# Patient Record
Sex: FEMALE | Race: WHITE | NOT HISPANIC OR LATINO | Employment: FULL TIME | URBAN - METROPOLITAN AREA
[De-identification: names, ages, dates, MRNs, and addresses within clinical notes are randomized per-mention and may not be internally consistent; named-entity substitution may affect disease eponyms.]

---

## 2017-10-23 ENCOUNTER — HOSPITAL ENCOUNTER (EMERGENCY)
Facility: HOSPITAL | Age: 27
Discharge: HOME | End: 2017-10-23
Attending: EMERGENCY MEDICINE | Admitting: EMERGENCY MEDICINE
Payer: COMMERCIAL

## 2017-10-23 VITALS
SYSTOLIC BLOOD PRESSURE: 124 MMHG | RESPIRATION RATE: 18 BRPM | TEMPERATURE: 98.5 F | OXYGEN SATURATION: 100 % | HEART RATE: 99 BPM | BODY MASS INDEX: 31.25 KG/M2 | DIASTOLIC BLOOD PRESSURE: 54 MMHG | WEIGHT: 160 LBS

## 2017-10-23 DIAGNOSIS — N30.90 CYSTITIS: ICD-10-CM

## 2017-10-23 DIAGNOSIS — N39.0 UTI (URINARY TRACT INFECTION): Primary | ICD-10-CM

## 2017-10-23 LAB
BACTERIA UR QL AUTO: ABNORMAL /HPF
BILIRUB UR QL STRIP: NEGATIVE
CLARITY UR: ABNORMAL
COLOR UR: YELLOW
GLUCOSE UR STRIP-MCNC: NEGATIVE MG/DL
HGB UR QL STRIP.AUTO: ABNORMAL
HYALINE CASTS #/AREA URNS LPF: ABNORMAL /LPF
KETONES UR STRIP-MCNC: NEGATIVE MG/DL
LEUKOCYTE ESTERASE UR QL STRIP: ABNORMAL
NITRITE UR QL STRIP: NEGATIVE
NON-SQ EPI CELLS URNS QL MICRO: ABNORMAL /HPF
PH UR STRIP.AUTO: 6 [PH] (ref 4.5–8)
PROT UR STRIP-MCNC: >=300 MG/DL
RBC #/AREA URNS AUTO: ABNORMAL /HPF
SP GR UR STRIP.AUTO: 1.02 (ref 1–1.03)
UROBILINOGEN UR QL STRIP.AUTO: 0.2 E.U./DL
WBC #/AREA URNS AUTO: ABNORMAL /HPF

## 2017-10-23 PROCEDURE — 87077 CULTURE AEROBIC IDENTIFY: CPT

## 2017-10-23 PROCEDURE — 81002 URINALYSIS NONAUTO W/O SCOPE: CPT | Performed by: EMERGENCY MEDICINE

## 2017-10-23 PROCEDURE — 87086 URINE CULTURE/COLONY COUNT: CPT

## 2017-10-23 PROCEDURE — 99283 EMERGENCY DEPT VISIT LOW MDM: CPT

## 2017-10-23 PROCEDURE — 87186 SC STD MICRODIL/AGAR DIL: CPT

## 2017-10-23 PROCEDURE — 81001 URINALYSIS AUTO W/SCOPE: CPT

## 2017-10-23 RX ORDER — CEPHALEXIN 500 MG/1
500 CAPSULE ORAL EVERY 8 HOURS SCHEDULED
Qty: 21 CAPSULE | Refills: 0 | Status: SHIPPED | OUTPATIENT
Start: 2017-10-23 | End: 2017-10-30

## 2017-10-23 RX ORDER — PHENAZOPYRIDINE HYDROCHLORIDE 100 MG/1
100 TABLET, FILM COATED ORAL 3 TIMES DAILY PRN
Qty: 6 TABLET | Refills: 0 | Status: SHIPPED | OUTPATIENT
Start: 2017-10-23 | End: 2017-10-23

## 2017-10-23 NOTE — ED ATTENDING ATTESTATION
Bushra Underwood MD, saw and evaluated the patient  I have discussed the patient with the resident/non-physician practitioner and agree with the resident's/non-physician practitioner's findings, Plan of Care, and MDM as documented in the resident's/non-physician practitioner's note, except where noted  All available labs and Radiology studies were reviewed  At this point I agree with the current assessment done in the Emergency Department  I have conducted an independent evaluation of this patient a history and physical is as follows:     this is a 45-year-old woman who is 1 week postpartum who presents with dysuria  The patient states that she feels like she has cystitis, with urinary frequency, urgency, and burning  The patient had a vaginal delivery about a week ago  It was complicated by retained placenta, which was manually reduce moved  The patient has not had changes in her discharge  She has not had fevers or chills  She has not had malaise, nausea, or vomiting  The patient has no other complaints at this time  On exam she has mild suprapubic pain  Her uterus is not boggy, but is firm  The remainder of her exam is benign  Impression: Likely urinary tract infection    Will check urine for evidence of infection and treat  Critical Care Time  CritCare Time

## 2017-10-23 NOTE — DISCHARGE INSTRUCTIONS

## 2017-10-23 NOTE — ED PROVIDER NOTES
History  Chief Complaint   Patient presents with    Possible UTI     Pt gave birth 1 week ago and states today "I have a really bad bladder infection " Pt c/o burning on urination     52-year-old who is approximately 1 week status post vaginal delivery presents complaining of a possible UTI  Reports having 2 days of dysuria, increased frequency and burning  Reports having small amount of vaginal bleeding which has been decreased since delivery  Delivery was complicated by retained products of conception requiring D&C  She does reports some suprapubic discomfort but no significant abdominal pain  No fevers or chills or back pain  None       Past Medical History:   Diagnosis Date    Psychiatric disorder        Past Surgical History:   Procedure Laterality Date    NO PAST SURGERIES         History reviewed  No pertinent family history  I have reviewed and agree with the history as documented  Social History   Substance Use Topics    Smoking status: Never Smoker    Smokeless tobacco: Never Used    Alcohol use No        Review of Systems   Constitutional: Negative for chills and fever  HENT: Negative for congestion and sore throat  Eyes: Negative for pain and redness  Respiratory: Negative for shortness of breath and wheezing  Cardiovascular: Negative for chest pain and palpitations  Gastrointestinal: Negative for abdominal pain, diarrhea and vomiting  Endocrine: Negative for polydipsia and polyphagia  Genitourinary: Positive for dysuria, frequency and vaginal bleeding  Negative for flank pain  Musculoskeletal: Negative for arthralgias and back pain  Skin: Negative for rash and wound  Neurological: Negative for seizures and headaches  Psychiatric/Behavioral: Negative for agitation and behavioral problems  All other systems reviewed and are negative        Physical Exam  ED Triage Vitals   Temperature Pulse Respirations Blood Pressure SpO2   10/23/17 1600 10/23/17 1439 10/23/17 1439 10/23/17 1439 10/23/17 1439   98 5 °F (36 9 °C) 99 18 124/54 100 %      Temp Source Heart Rate Source Patient Position - Orthostatic VS BP Location FiO2 (%)   10/23/17 1600 10/23/17 1439 10/23/17 1439 10/23/17 1439 --   Oral Monitor Sitting Left arm       Pain Score       10/23/17 1439       No Pain           Physical Exam   Constitutional: She is oriented to person, place, and time  She appears well-developed and well-nourished  HENT:   Head: Normocephalic and atraumatic  Right Ear: External ear normal    Left Ear: External ear normal    Mouth/Throat: Oropharynx is clear and moist    Eyes: EOM are normal  Pupils are equal, round, and reactive to light  Neck: Normal range of motion  Cardiovascular: Normal rate, regular rhythm and normal heart sounds  Exam reveals no friction rub  No murmur heard  Pulmonary/Chest: Effort normal  No respiratory distress  She has no wheezes  Abdominal: Soft  Bowel sounds are normal  She exhibits no distension  There is no rebound and no guarding  Mild suprapubic tenderness to palpation without rebound or guarding   Musculoskeletal: Normal range of motion  She exhibits no edema  Neurological: She is alert and oriented to person, place, and time  No cranial nerve deficit  Coordination normal    Skin: Skin is warm  No erythema  Psychiatric: She has a normal mood and affect  Her behavior is normal    Nursing note and vitals reviewed        ED Medications  Medications - No data to display    Diagnostic Studies  Labs Reviewed   URINE MICROSCOPIC - Abnormal        Result Value Ref Range Status    RBC, UA 30-50 (*) None Seen, 0-5 /hpf Final    WBC, UA Innumerable (*) None Seen, 0-5, 5-55, 5-65 /hpf Final    Hyaline Casts, UA 5-10 (*) None Seen /lpf Final    Epithelial Cells Occasional  None Seen, Occasional /hpf Final    Bacteria, UA Occasional  None Seen, Occasional /hpf Final   ED URINE MACROSCOPIC - Abnormal     Leukocytes, UA Small (*) Negative Final Protein, UA >=300 (*) Negative mg/dl Final    Blood, UA Large (*) Negative Final    Color, UA Yellow   Final    Clarity, UA Slightly Cloudy   Final    pH, UA 6 0  4 5 - 8 0 Final    Nitrite, UA Negative  Negative Final    Glucose, UA Negative  Negative mg/dl Final    Ketones, UA Negative  Negative mg/dl Final    Urobilinogen, UA 0 2  0 2, 1 0 E U /dl E U /dl Final    Bilirubin, UA Negative  Negative Final    Specific Gravity, UA 1 025  1 003 - 1 030 Final    Narrative:     CLINITEK RESULT   POCT URINALYSIS DIPSTICK - Normal    Color, UA     Final    Comment: done by prior staff   URINE CULTURE       No orders to display       Procedures  Procedures      Phone Consults  ED Phone Contact    ED Course  ED Course                                MDM  Number of Diagnoses or Management Options  Cystitis:   UTI (urinary tract infection):   Diagnosis management comments: Impression:  Well-appearing patient with suprapubic pain and dysuria, likely cystitis/UTI  Plan:  Urinalysis, treat for UTI    CritCare Time    Disposition  Final diagnoses:   UTI (urinary tract infection)   Cystitis     ED Disposition     ED Disposition Condition Comment    Discharge  Tita Cowcheco discharge to home/self care      Condition at discharge: Good        Follow-up Information     Follow up With Specialties Details Why [facility] Emergency Department Emergency Medicine  As needed, If symptoms worsen [address]  [phone]  ED, [address] [place] [address], [phone]        Discharge Medication List as of 10/23/2017  4:06 PM      START taking these medications    Details   cephalexin (KEFLEX) 500 mg capsule Take 1 capsule by mouth every 8 (eight) hours for 7 days, Starting Mon 10/23/2017, Until Mon 10/30/2017, Print      phenazopyridine (PYRIDIUM) 100 mg tablet Take 1 tablet by mouth 3 (three) times a day as needed for bladder spasms for up to 2 days, Starting Mon 10/23/2017, Until Wed 10/25/2017, Print           No discharge procedures on file  ED Provider  Attending physically available and evaluated Erorl Alvarez I managed the patient along with the ED Attending      Electronically Signed by       Marybel Yoon MD  Resident  10/24/17 8642

## 2017-10-25 LAB — BACTERIA UR CULT: ABNORMAL

## 2019-01-07 ENCOUNTER — OFFICE VISIT (OUTPATIENT)
Dept: OBGYN CLINIC | Facility: CLINIC | Age: 29
End: 2019-01-07

## 2019-01-07 ENCOUNTER — APPOINTMENT (OUTPATIENT)
Dept: LAB | Facility: HOSPITAL | Age: 29
End: 2019-01-07
Payer: COMMERCIAL

## 2019-01-07 VITALS
BODY MASS INDEX: 29.25 KG/M2 | DIASTOLIC BLOOD PRESSURE: 77 MMHG | HEIGHT: 60 IN | WEIGHT: 149 LBS | SYSTOLIC BLOOD PRESSURE: 121 MMHG | HEART RATE: 88 BPM

## 2019-01-07 DIAGNOSIS — Z72.51 HIGH RISK HETEROSEXUAL BEHAVIOR: Primary | ICD-10-CM

## 2019-01-07 DIAGNOSIS — B37.3 VAGINAL YEAST INFECTION: ICD-10-CM

## 2019-01-07 DIAGNOSIS — Z72.51 HIGH RISK HETEROSEXUAL BEHAVIOR: ICD-10-CM

## 2019-01-07 LAB
C TRACH DNA SPEC QL NAA+PROBE: NEGATIVE
N GONORRHOEA DNA SPEC QL NAA+PROBE: NEGATIVE

## 2019-01-07 PROCEDURE — 87591 N.GONORRHOEAE DNA AMP PROB: CPT | Performed by: NURSE PRACTITIONER

## 2019-01-07 PROCEDURE — 36415 COLL VENOUS BLD VENIPUNCTURE: CPT

## 2019-01-07 PROCEDURE — 87389 HIV-1 AG W/HIV-1&-2 AB AG IA: CPT

## 2019-01-07 PROCEDURE — 99202 OFFICE O/P NEW SF 15 MIN: CPT | Performed by: NURSE PRACTITIONER

## 2019-01-07 PROCEDURE — 87491 CHLMYD TRACH DNA AMP PROBE: CPT | Performed by: NURSE PRACTITIONER

## 2019-01-07 PROCEDURE — 86592 SYPHILIS TEST NON-TREP QUAL: CPT

## 2019-01-07 PROCEDURE — 87340 HEPATITIS B SURFACE AG IA: CPT

## 2019-01-07 PROCEDURE — 87210 SMEAR WET MOUNT SALINE/INK: CPT | Performed by: NURSE PRACTITIONER

## 2019-01-07 RX ORDER — CLOTRIMAZOLE AND BETAMETHASONE DIPROPIONATE 10; .64 MG/G; MG/G
1 CREAM TOPICAL AS NEEDED
COMMUNITY
Start: 2018-10-19 | End: 2019-02-22 | Stop reason: SDUPTHER

## 2019-01-07 RX ORDER — FLUCONAZOLE 150 MG/1
150 TABLET ORAL ONCE
Qty: 1 TABLET | Refills: 0 | Status: SHIPPED | OUTPATIENT
Start: 2019-01-07 | End: 2019-01-07

## 2019-01-07 NOTE — PATIENT INSTRUCTIONS
Vulvovaginal Candidiasis   AMBULATORY CARE:   Vulvovaginal candidiasis,  or yeast infection, is a common vaginal infection  Vulvovaginal candidiasis is caused by a fungus, or yeast-like germ  Fungi are normally found in your vagina  When there are too many fungi, it can cause an infection  Seek care immediately if:   · You have fever and chills  · You are bleeding from your vagina and it is not your monthly period  · You develop abdominal or pelvic pain  Contact your healthcare provider if:   · Your signs and symptoms get worse, even after treatment  · You have questions or concerns about your condition or care  Signs and symptoms:   · Thick, white, cheese-like discharge from your vagina    · Itching, swelling, or redness in your vagina    · Burning when you urinate  Treatment for vulvovaginal candidiasis  includes medicines to treat the fungal infection and decrease inflammation  The medicine may be a pill, topical cream, or vaginal suppository  Manage your symptoms:   · Wear cotton underwear  · Keep the vaginal area clean and dry  · Do not have sex until your symptoms are gone  · Do not douche  · Do not use feminine hygiene sprays, powders, or bubble bath  Prevent another infection:   · Take showers instead of baths  · Eat yogurt  · Limit the amount of alcohol you drink  · Control your blood sugar if you are diabetic  · Limit your time in hot tubs  Follow up with your healthcare provider as directed:  Write down your questions so you remember to ask them during your visits  © 2017 [address] [employer] Information is for End User's use only and may not be sold, redistributed or otherwise used for commercial purposes  All illustrations and images included in CareNotes® are the copyrighted property of [employer] M [employer]  or Sammy Yuen  The above information is an  only   It is not intended as medical advice for individual conditions or treatments  Talk to your doctor, nurse or pharmacist before following any medical regimen to see if it is safe and effective for you  Safe Sex   WHAT YOU NEED TO KNOW:   What is safe sex? Safe sex is a combination of practices you can do to prevent pregnancy and the spread of sexually transmitted infections (STIs)  These practices help to decrease or prevent the exchange of body fluids during sexual contact  Body fluids include saliva, urine, blood, vaginal fluids, and semen  All types of sex can cause STIs  This includes oral, vaginal, and anal sex  How do I practice safe sex? Talk to your partner before you have sex  Ask about his or her sexual history and any current or past STI  · Use condoms and barrier methods for all types of sexual contact  Use a new condom or latex barrier each time you have sex  This includes oral, vaginal, and anal sex  Make sure that the condom fits and is put on correctly  Rubber latex sheets or dental dams can be used for oral sex  Ask your healthcare provider how to use these items and where to purchase them  If you are allergic to latex, use a nonlatex product such as polyurethane  · Limit your number of sexual partners  More than one sex partner can increase your risk for an STI  Do not have sex with anyone whose sexual history you do not know  · Do not do activities that can pass germs  Do not use saliva as a lubricant or share sex toys  · Tell your sex partner if you have an STI  Your partner may need to be tested and treated  Do not have sex while you are being treated for an STI, or with a partner who is being treated  · Get tested regularly for STIs  Get tested if you have had sexual contact with someone who has an STI  Get tested if you have unprotected sex with any new partner  · Get vaccinated  Vaccines may help to lower your risk for an STI such as HPV, hepatitis A, or hepatitis B   Ask your healthcare provider for more information on vaccines  How else can I practice safe sex? · Only use water-based lubricants during sex  Water-based lubricants may prevent sores or cuts in the vagina or penis  Prevent sores or cuts to decrease your risk for an STI  Do not use oil-based lubricants, such as baby oil or hand lotion, with latex condoms or barriers  These will weaken the latex and may cause it to break  · Do not use chemical irritants on condoms or genitals  Products that contain chemical irritants, such as spermicides, can irritate the lining of your vagina or rectum  Irritation may cause sores that may increase your risk for an STI  · Be careful when you have sex if you have open sores or cuts  Open sores or cuts may increase your risk for an STI  This includes new piercings and tattoos  Keep all open sores or cuts covered during sex  Do not have oral sex if you have cuts or sores in your mouth  Ask your healthcare provider when it is safe to have sex after you get a tattoo or piercing  · Do not use alcohol or drugs before sex  These substances can prevent you from thinking clearly and increase your risk for unsafe sex  Where can I find more information? · [address] [address] ([employer]UREL[employer])  P O  [address] [facility] , [address] [place] [address]  Web Address: [email or web address]  [email or web address]  When should I seek immediate care? · A condom breaks, leaks, or slips off while you are having sex  · You notice sores on your penis, vagina, anal area, or the skin around them  · You have had unsafe sex and want to discuss emergency contraception or treatment for STI exposure  When should I contact my healthcare provider? · You think you might be pregnant  · You have questions or concerns about your condition or care  CARE AGREEMENT:   Informed consent  is a legal document that explains the tests, treatments, or procedures that you may need   Informed consent means you understand what will be done and can make decisions about what you want  You give your permission when you sign the consent form  You can have someone sign this form for you if you are not able to sign it  You have the right to understand your medical care in words you know  Before you sign the consent form, understand the risks and benefits of what will be done  Make sure all your questions are answered  The above information is an  only  It is not intended as medical advice for individual conditions or treatments  Talk to your doctor, nurse or pharmacist before following any medical regimen to see if it is safe and effective for you  © 2017 [facility] [facility] Information is for End User's use only and may not be sold, redistributed or otherwise used for commercial purposes  All illustrations and images included in CareNotes® are the copyrighted property of A D A M , Inc  or Sammy Alpa  Fluconazole (By mouth)   Fluconazole ([id number])  Prevents and treats fungal infections  Brand Name(s): Diflucan   There may be other brand names for this medicine  When This Medicine Should Not Be Used: This medicine is not right for everyone  Do not use it if you had an allergic reaction to fluconazole, or if you are pregnant  How to Use This Medicine:   Liquid, Tablet  · Your doctor will tell you how much medicine to use  Do not use more than directed  · Oral liquid: Shake well just before each use  Measure the oral liquid medicine with a marked measuring spoon, oral syringe, or medicine cup  · Take all of the medicine in your prescription to clear up your infection, even if you feel better after the first few doses  · Read and follow the patient instructions that come with this medicine  Talk to your doctor or pharmacist if you have any questions  · Missed dose: Take a dose as soon as you remember  If it is almost time for your next dose, wait until then and take a regular dose   Do not take extra medicine to make up for a missed dose   · Store the medicine in a closed container at room temperature, away from heat, moisture, and direct light  Store the oral liquid in the refrigerator or at room temperature and use it within 14 days  Do not freeze  Drugs and Foods to Avoid:   Ask your doctor or pharmacist before using any other medicine, including over-the-counter medicines, vitamins, and herbal products  · Do not use this medicine together with astemizole, cisapride, erythromycin, pimozide, quinidine, or terfenadine  · Some foods and medicines can affect how fluconazole works  Tell your doctor if you are using cimetidine, midazolam, prednisone, rifabutin, rifampin, theophylline, tofacitinib, triazolam, vitamin A supplements, or voriconazole  Also tell your doctor if you are using any of the following:   ¨ A blood thinner (such as warfarin)  ¨ A diuretic or "water pill" (such as hydrochlorothiazide), or blood pressure medicine (such as amlodipine, felodipine, isradipine, losartan, nifedipine)  ¨ Birth control pills  ¨ Cancer medicine (cyclophosphamide, vinblastine, vincristine)  ¨ Diabetes medicine that you take by mouth (glipizide, glyburide, tolbutamide)  ¨ Medicine to lower cholesterol (atorvastatin, fluvastatin, simvastatin)  ¨ Medicine to treat depression (amitriptyline, nortriptyline)  ¨ Medicine to treat HIV/AIDS (saquinavir, zidovudine)  ¨ Medicine to treat malaria (halofantrine)  ¨ Medicine to treat seizures (carbamazepine, phenytoin)  ¨ Medicine that weakens the immune system (cyclosporine, sirolimus, tacrolimus)  ¨ Narcotic pain medicine (alfentanil, fentanyl, methadone)  ¨ Pain or arthritis medicine (aspirin, celecoxib, diclofenac, ibuprofen, naproxen)  Warnings While Using This Medicine:   · It is not safe to take this medicine during pregnancy  It could harm an unborn baby  Tell your doctor right away if you become pregnant    · Tell your doctor if you are breastfeeding, or if you have kidney disease, liver disease, heart disease, heart rhythm problems, cancer, or HIV/AIDS  · This medicine may cause the following problems:   ¨ Liver problems  ¨ Serious skin reactions  ¨ Changes in heart rhythm, such as a condition called QT prolongation  · This medicine may make you dizzy or drowsy  Do not drive or do anything that could be dangerous until you know how this medicine affects you  · Call your doctor if your symptoms do not improve or if they get worse  · Keep all medicine out of the reach of children  Never share your medicine with anyone  Possible Side Effects While Using This Medicine:   Call your doctor right away if you notice any of these side effects:  · Allergic reaction: Itching or hives, swelling in your face or hands, swelling or tingling in your mouth or throat, chest tightness, trouble breathing  · Blistering, peeling, or red skin rash  · Dark urine or pale stools, nausea, vomiting, loss of appetite, stomach pain, yellow skin or eyes  · Fast, pounding, or uneven heartbeat  · Unusual bleeding, bruising, or weakness  If you notice these less serious side effects, talk with your doctor:   · Headache  · Mild nausea, vomiting, stomach pain, or diarrhea  If you notice other side effects that you think are caused by this medicine, tell your doctor  Call your doctor for medical advice about side effects  You may report side effects to FDA at 8-045-FDA-8010  © 2017 [address] [employer] Information is for End User's use only and may not be sold, redistributed or otherwise used for commercial purposes  The above information is an  only  It is not intended as medical advice for individual conditions or treatments  Talk to your doctor, nurse or pharmacist before following any medical regimen to see if it is safe and effective for you

## 2019-01-07 NOTE — PROGRESS NOTES
Assessment/Plan:      Diagnoses and all orders for this visit:    High risk heterosexual behavior  -     Chlamydia/GC amplified DNA by PCR  -     Hepatitis B surface antigen; Future  -     HIV 1/2 AG-AB combo; Future  -     RPR; Future    Vaginal yeast infection  -     fluconazole (DIFLUCAN) 150 mg tablet; Take 1 tablet (150 mg total) by mouth once for 1 dose  -     POCT wet mount    Other orders  -     clotrimazole-betamethasone (LOTRISONE) 1-0 05 % cream; Apply 1 application topically as needed  -     levonorgestrel (MIRENA) 20 MCG/24HR IUD; 1 each by Intrauterine route once      - reviewed with patient safe sex practices including consistent condom use   -vaginal yeast infection     -Rx fluconazole 1 tablet today      -written information provided  Patient encouraged to avoid douching, scented soaps lotions or lubricants  -will call with abnormal results   -encouraged patient have records sent to our office from prior OB/ GYN    RTO 6 months for annual exam     Subjective:     Patient ID: Denilson Leach is a 33 y o  female  HPI  here requesting STI testing  Has had 2 new partners in the past 6 months  Has Mirena IUD for contraception  Mirena IUD was placed approximately 2016  inconsistently uses condoms  Patient states she has had 2-3 weeks of white thick vaginal discharge denies odor or itching  Has not used any over-the-counter products  Denies alleviating or aggravating factors  Denies pelvic pain, urinary symptoms  Last Pap smear/annual exam-6 months ago in [facility] patient denies history of abnormal Pap smears  Review of Systems   Constitutional: Negative for chills and fever  Respiratory: Negative  Cardiovascular: Negative  Gastrointestinal: Negative  Genitourinary: Positive for vaginal discharge  Negative for difficulty urinating, dyspareunia, frequency, pelvic pain, urgency, vaginal bleeding and vaginal pain  Neurological: Negative for headaches           Objective: Physical Exam   Constitutional: She is oriented to person, place, and time  She appears well-developed and well-nourished  Cardiovascular: Normal rate, regular rhythm and normal heart sounds  Pulmonary/Chest: Effort normal and breath sounds normal    Genitourinary: There is no rash, tenderness, lesion or injury on the right labia  There is no rash, tenderness, lesion or injury on the left labia  Cervix exhibits no motion tenderness, no discharge and no friability  No erythema, tenderness or bleeding in the vagina  No foreign body in the vagina  No signs of injury around the vagina  Vaginal discharge found  Genitourinary Comments: Mirena IUD strings visualized with speculum exam   Small amount of a white vaginal discharge  Neurological: She is alert and oriented to person, place, and time  Skin: Skin is warm and dry  Psychiatric: She has a normal mood and affect   Her behavior is normal

## 2019-01-08 LAB
HBV SURFACE AG SER QL: NORMAL
HIV 1+2 AB+HIV1 P24 AG SERPL QL IA: NORMAL
RPR SER QL: NORMAL

## 2019-02-21 ENCOUNTER — TELEPHONE (OUTPATIENT)
Dept: OBGYN CLINIC | Facility: CLINIC | Age: 29
End: 2019-02-21

## 2019-02-22 DIAGNOSIS — N90.89 VULVAR IRRITATION: Primary | ICD-10-CM

## 2019-02-22 RX ORDER — CLOTRIMAZOLE AND BETAMETHASONE DIPROPIONATE 10; .64 MG/G; MG/G
1 CREAM TOPICAL AS NEEDED
Qty: 45 G | Refills: 0 | Status: SHIPPED | OUTPATIENT
Start: 2019-02-22 | End: 2019-11-08 | Stop reason: ALTCHOICE

## 2019-02-26 ENCOUNTER — TELEPHONE (OUTPATIENT)
Dept: OBGYN CLINIC | Facility: CLINIC | Age: 29
End: 2019-02-26

## 2019-04-04 DIAGNOSIS — B37.3 VAGINAL YEAST INFECTION: Primary | ICD-10-CM

## 2019-04-04 RX ORDER — FLUCONAZOLE 150 MG/1
1 TABLET ORAL ONCE
COMMUNITY
Start: 2019-01-07 | End: 2019-04-04 | Stop reason: SDUPTHER

## 2019-04-04 RX ORDER — FLUCONAZOLE 150 MG/1
150 TABLET ORAL ONCE
Qty: 1 TABLET | Refills: 0 | Status: SHIPPED | OUTPATIENT
Start: 2019-04-04 | End: 2019-04-04

## 2019-05-28 ENCOUNTER — HOSPITAL ENCOUNTER (EMERGENCY)
Facility: HOSPITAL | Age: 29
Discharge: HOME | End: 2019-05-28
Attending: EMERGENCY MEDICINE

## 2019-05-28 VITALS
HEART RATE: 86 BPM | BODY MASS INDEX: 30.86 KG/M2 | HEIGHT: 60 IN | RESPIRATION RATE: 18 BRPM | OXYGEN SATURATION: 96 % | SYSTOLIC BLOOD PRESSURE: 111 MMHG | TEMPERATURE: 98.8 F | DIASTOLIC BLOOD PRESSURE: 59 MMHG | WEIGHT: 157.19 LBS

## 2019-05-28 DIAGNOSIS — H60.90 OTITIS EXTERNA: Primary | ICD-10-CM

## 2019-05-28 PROCEDURE — 99282 EMERGENCY DEPT VISIT SF MDM: CPT | Performed by: PHYSICIAN ASSISTANT

## 2019-05-28 PROCEDURE — 99282 EMERGENCY DEPT VISIT SF MDM: CPT

## 2019-05-28 RX ORDER — OFLOXACIN 3 MG/ML
5 SOLUTION AURICULAR (OTIC) 2 TIMES DAILY
Qty: 5 ML | Refills: 0 | Status: SHIPPED | OUTPATIENT
Start: 2019-05-28 | End: 2019-11-08 | Stop reason: ALTCHOICE

## 2019-11-08 ENCOUNTER — OFFICE VISIT (OUTPATIENT)
Dept: INTERNAL MEDICINE CLINIC | Facility: CLINIC | Age: 29
End: 2019-11-08

## 2019-11-08 VITALS
HEIGHT: 62 IN | TEMPERATURE: 98.7 F | BODY MASS INDEX: 27.57 KG/M2 | HEART RATE: 98 BPM | OXYGEN SATURATION: 99 % | WEIGHT: 149.8 LBS | DIASTOLIC BLOOD PRESSURE: 76 MMHG | SYSTOLIC BLOOD PRESSURE: 117 MMHG

## 2019-11-08 DIAGNOSIS — J00 ACUTE NASOPHARYNGITIS: Primary | ICD-10-CM

## 2019-11-08 PROCEDURE — 99202 OFFICE O/P NEW SF 15 MIN: CPT | Performed by: FAMILY MEDICINE

## 2019-11-08 RX ORDER — AZITHROMYCIN 250 MG/1
TABLET, FILM COATED ORAL
Qty: 6 TABLET | Refills: 0 | Status: SHIPPED | OUTPATIENT
Start: 2019-11-08 | End: 2019-11-13

## 2019-11-08 RX ORDER — AZITHROMYCIN 250 MG/1
TABLET, FILM COATED ORAL
Qty: 6 TABLET | Refills: 0 | Status: SHIPPED | OUTPATIENT
Start: 2019-11-08 | End: 2019-11-08 | Stop reason: SDUPTHER

## 2019-11-08 NOTE — PROGRESS NOTES
Assessment/Plan:    No problem-specific Assessment & Plan notes found for this encounter  Problem List Items Addressed This Visit        Digestive    Acute nasopharyngitis - Primary    Relevant Medications    azithromycin (ZITHROMAX) 250 mg tablet            Supportive care  Complete antibiotics  Call if any issues  Subjective:      Patient ID: Sachin Swartz is a 30 y o  female  HPI Cold Like Symptoms (Patient c/o a productive cough and fever x 1 week ) t max 101, has a sick contact from her nephew who is improving  Her temperatures have not better but none of her productive cough  Not short of breath and not wheezing  No ear pain but mild sore throat from coughing  Not taking any medications over-the-counter  The following portions of the patient's history were reviewed and updated as appropriate: allergies, current medications, past family history, past medical history, past social history, past surgical history and problem list     Review of Systems    Constitutional:  See HPI  Eyes:  Denies change in visual acuity   HENT:  Denies nasal congestion or sore throat   Respiratory:  Denies  shortness of breath or wheezing  Cardiovascular:  Denies palpitations or chest pain  GI:  Denies abdominal pain, nausea, or vomiting  Integument:  Denies rash   Neurologic:  Denies headache or focal weakness      Objective:      /76 (BP Location: Left arm, Patient Position: Sitting, Cuff Size: Standard)   Pulse 98   Temp 98 7 °F (37 1 °C) (Oral)   Ht 5' 1 93" (1 573 m) Comment: with shoes  Wt 67 9 kg (149 lb 12 8 oz) Comment: with shoes  SpO2 99%   BMI 27 46 kg/m²          Physical Exam      Constitutional:  Well developed, well nourished, no acute distress, non-toxic appearance   Eyes:  PERRL, conjunctiva normal , non icteric sclera  HENT:  Atraumatic, oropharynx moist   Postnasal drip noted in posterior pharynx    Neck-  supple no sinus tenderness to palpation  Respiratory:  CTA b/l, normal breath sounds, no rales, no wheezing   Cardiovascular:  RRR, no murmurs, no LE edema b/l  GI:  Soft, nondistended, normal bowel sounds x 4, nontender, no organomegaly, no mass, no rebound, no guarding   Neurologic:  no focal deficits noted   Psychiatric:  Speech and behavior appropriate , AAO x 3  Lymph node, no cervical lymphadenopathy

## 2019-12-09 ENCOUNTER — APPOINTMENT (EMERGENCY)
Dept: RADIOLOGY | Facility: HOSPITAL | Age: 29
End: 2019-12-09

## 2019-12-09 ENCOUNTER — HOSPITAL ENCOUNTER (EMERGENCY)
Facility: HOSPITAL | Age: 29
Discharge: HOME | End: 2019-12-09
Attending: EMERGENCY MEDICINE | Admitting: EMERGENCY MEDICINE

## 2019-12-09 VITALS
HEIGHT: 60 IN | SYSTOLIC BLOOD PRESSURE: 136 MMHG | DIASTOLIC BLOOD PRESSURE: 81 MMHG | HEART RATE: 85 BPM | BODY MASS INDEX: 28.47 KG/M2 | WEIGHT: 145 LBS | TEMPERATURE: 98.3 F | RESPIRATION RATE: 18 BRPM | OXYGEN SATURATION: 98 %

## 2019-12-09 DIAGNOSIS — R10.32 LEFT LOWER QUADRANT ABDOMINAL PAIN: Primary | ICD-10-CM

## 2019-12-09 DIAGNOSIS — T83.9XXA IUD COMPLICATION (HCC): ICD-10-CM

## 2019-12-09 LAB
C TRACH DNA SPEC QL NAA+PROBE: NEGATIVE
EXT PREG TEST URINE: NEGATIVE
EXT. CONTROL ED NAV: NORMAL
N GONORRHOEA DNA SPEC QL NAA+PROBE: NEGATIVE

## 2019-12-09 PROCEDURE — 76856 US EXAM PELVIC COMPLETE: CPT

## 2019-12-09 PROCEDURE — 99284 EMERGENCY DEPT VISIT MOD MDM: CPT | Performed by: EMERGENCY MEDICINE

## 2019-12-09 PROCEDURE — 87491 CHLMYD TRACH DNA AMP PROBE: CPT | Performed by: EMERGENCY MEDICINE

## 2019-12-09 PROCEDURE — 81025 URINE PREGNANCY TEST: CPT | Performed by: EMERGENCY MEDICINE

## 2019-12-09 PROCEDURE — 99284 EMERGENCY DEPT VISIT MOD MDM: CPT

## 2019-12-09 PROCEDURE — 87591 N.GONORRHOEAE DNA AMP PROB: CPT | Performed by: EMERGENCY MEDICINE

## 2019-12-09 RX ORDER — METRONIDAZOLE 500 MG/1
500 TABLET ORAL EVERY 12 HOURS SCHEDULED
Qty: 14 TABLET | Refills: 0 | Status: SHIPPED | OUTPATIENT
Start: 2019-12-09 | End: 2019-12-16

## 2019-12-09 RX ORDER — METRONIDAZOLE 500 MG/1
500 TABLET ORAL EVERY 12 HOURS SCHEDULED
Qty: 14 TABLET | Refills: 0 | Status: SHIPPED | OUTPATIENT
Start: 2019-12-09 | End: 2019-12-09 | Stop reason: SDUPTHER

## 2019-12-09 NOTE — ED PROVIDER NOTES
History  Chief Complaint   Patient presents with    Abdominal Pain     Patient reports a sharp left sided abdominal pain starting last night; states she missed her period and has an IUD; is concerned it is something with that      30 YOF who presents due to LLQ abdominal pain  She states her pain started yesterday and has been constant since and progressively worsening  She has never had pain like this before  She also notes she had an IUD placed 2 years ago and has had regular menstrual cycles since  She has not had her menstrual cycle since  which is atypical for her  She has tried ibuprofen for her pain without relief  She denies any fevers, diarrhea, N/V, urinary symptoms, vaginal discharge  Prior to Admission Medications   Prescriptions Last Dose Informant Patient Reported? Taking?   levonorgestrel (MIRENA) 20 MCG/24HR IUD   Yes Yes   Si each by Intrauterine route once      Facility-Administered Medications: None       Past Medical History:   Diagnosis Date    ADHD     Anemia     with pregnancy     Anxiety     Depression     Gestational diabetes     2017    History of migraine headaches     PTSD (post-traumatic stress disorder)        Past Surgical History:   Procedure Laterality Date    NO PAST SURGERIES         Family History   Problem Relation Age of Onset    Anxiety disorder Mother     No Known Problems Father     Anxiety disorder Sister     Anxiety disorder Brother     Other Daughter         non-ketotic hyperglycemia      I have reviewed and agree with the history as documented  Social History     Tobacco Use    Smoking status: Never Smoker    Smokeless tobacco: Never Used   Substance Use Topics    Alcohol use: Not Currently    Drug use: No        Review of Systems   Constitutional: Negative for chills and fever  Eyes: Negative for visual disturbance  Respiratory: Negative for cough, chest tightness and shortness of breath      Cardiovascular: Negative for chest pain and leg swelling  Gastrointestinal: Positive for abdominal pain  Negative for abdominal distention, diarrhea, nausea and vomiting  Genitourinary: Positive for menstrual problem  Negative for dysuria, flank pain, frequency, urgency, vaginal bleeding and vaginal discharge  Musculoskeletal: Negative for back pain and gait problem  Skin: Negative for pallor and rash  Neurological: Negative for syncope, weakness, light-headedness and headaches  All other systems reviewed and are negative  Physical Exam  ED Triage Vitals [12/09/19 0401]   Temperature Pulse Respirations Blood Pressure SpO2   98 3 °F (36 8 °C) 90 18 139/87 98 %      Temp Source Heart Rate Source Patient Position - Orthostatic VS BP Location FiO2 (%)   Oral Monitor Lying Right arm --      Pain Score       8             Orthostatic Vital Signs  Vitals:    12/09/19 0401 12/09/19 0609   BP: 139/87 136/81   Pulse: 90 85   Patient Position - Orthostatic VS: Lying Lying       Physical Exam   Constitutional: She is oriented to person, place, and time  No distress  HENT:   Head: Normocephalic and atraumatic  Mouth/Throat: Oropharynx is clear and moist    Eyes: Conjunctivae are normal  No scleral icterus  Neck: Normal range of motion  Neck supple  Cardiovascular: Normal rate and regular rhythm  Exam reveals no gallop and no friction rub  No murmur heard  Pulmonary/Chest: Effort normal and breath sounds normal  She has no wheezes  She has no rales  Abdominal: Soft  Bowel sounds are normal  She exhibits no distension  There is tenderness in the left lower quadrant  There is no rigidity, no rebound and no guarding  Genitourinary: There is no rash or lesion on the right labia  There is no rash or lesion on the left labia  Cervix exhibits no motion tenderness, no discharge and no friability  Vaginal discharge (thin grey discharge) found  Musculoskeletal: Normal range of motion  She exhibits no edema     Neurological: She is alert and oriented to person, place, and time  Skin: Skin is warm and dry  No rash noted  No pallor  Psychiatric: She has a normal mood and affect  Her behavior is normal    Nursing note and vitals reviewed  ED Medications  Medications - No data to display    Diagnostic Studies  Results Reviewed     Procedure Component Value Units Date/Time    Chlamydia/GC amplified DNA by PCR [[id number]] Collected:  12/09/19 0607    Lab Status: In process Specimen:  Genital from Cervix Updated:  12/09/19 0611    POCT pregnancy, urine [[id number]]  (Normal) Resulted:  12/09/19 0449    Lab Status:  Final result Updated:  12/09/19 0450     EXT PREG TEST UR (Ref: Negative) Negative     Control Valid                 US pelvis complete non OB   Final Result by Glenny Chatterjee MD (12/09 0636)       Low-lying intrauterine device within the cervical canal extending into the vaginal vault         No evidence of torsion                      Workstation performed: [id number]               Procedures  Procedures      ED Course                               MDM  Number of Diagnoses or Management Options  IUD complication [facility]):   Left lower quadrant abdominal pain:   Diagnosis management comments: 30 YOF who presents due to LLQ abdominal pain  Urine pregnancy negative  Plan to get transvaginal US to evaluate for ovarian pathology and placement of IUD  US showed low lying IUD, otherwise unremarkable  Pelvic exam showed discharge consistent with bacterial vaginosis  GC chlamydia sent  Discharged with flagyl and OBGYN follow up  Return precautions discussed         Amount and/or Complexity of Data Reviewed  Clinical lab tests: ordered and reviewed  Tests in the radiology section of CPT®: ordered and reviewed  Decide to obtain previous medical records or to obtain history from someone other than the patient: yes  Review and summarize past medical records: yes  Discuss the patient with other providers: yes    Risk of Complications, Morbidity, and/or Mortality  Presenting problems: low  Management options: low    Patient Progress  Patient progress: stable        Disposition  Final diagnoses:   Left lower quadrant abdominal pain   IUD complication ([address] )     Time reflects when diagnosis was documented in both MDM as applicable and the Disposition within this note     Time User Action Codes Description Comment    12/9/2019  6:41 AM Rita Orf Add [R10 32] Left lower quadrant abdominal pain     12/9/2019  6:42 AM Amy Cast  32  9XXA] IUD complication [facility])       ED Disposition     ED Disposition Condition Date/Time Comment    Discharge Stable Mon Dec 9, 2019  6:41 AM Janice Blum discharge to home/self care  Follow-up Information     Follow up With Specialties Details Why [address] [facility] Obstetrics and Gynecology Schedule an appointment as soon as possible for a visit   [address] [address] [phone]          Discharge Medication List as of 12/9/2019  6:44 AM      START taking these medications    Details   metroNIDAZOLE (FLAGYL) 500 mg tablet Take 1 tablet (500 mg total) by mouth every 12 (twelve) hours for 7 days, Starting Mon 12/9/2019, Until Mon 12/16/2019, Print         CONTINUE these medications which have NOT CHANGED    Details   levonorgestrel (MIRENA) 20 MCG/24HR IUD 1 each by Intrauterine route once, Historical Med           No discharge procedures on file  ED Provider  Attending physically available and evaluated Janice Blum I managed the patient along with the ED Attending      Electronically Signed by         Wilbur Severe, MD  12/09/19 8555

## 2019-12-09 NOTE — DISCHARGE INSTRUCTIONS
You came to the emergency department for evaluation of abdominal pain  Your ultrasound showed that your IUD is low lying  Please follow up with your OBGYN for this  We also performed a pelvic exam which showed discharge consistent with bacterial vaginosis which we gave you a prescription to treat  Please return to the emergency department if you develop worsening symptoms, fevers, or anything else concerning to you

## 2019-12-09 NOTE — ED ATTENDING ATTESTATION
Mara Lassiter MD, saw and evaluated the patient  All available labs and X-rays were ordered by me or the resident and have been reviewed by myself  I discussed the patient with the resident / non-physician and agree with the resident's / non-physician practitioner's findings and plan as documented in the resident's / non-physician practicitioner's note, except where noted  At this point, I agree with the current assessment done in the ED  I was present during key portions of all procedures performed unless otherwise stated  Chief Complaint   Patient presents with    Abdominal Pain     Patient reports a sharp left sided abdominal pain starting last night; states she missed her period and has an IUD; is concerned it is something with that      This is a 21-year-old female presenting for evaluation of left lower quadrant pain  The patient states that she had an IUD placed 2 years ago, has been doing well  She does get menstrual cycles  She missed her last menstrual cycle which should have been at towards the mid towards the end of November  She denies any fevers chills nausea vomiting but since yesterday, about 30 hours ago has been having this sharp near continuous left lower quadrant pain  It does not radiate elsewhere  Does not go towards the back  No vaginal bleeding or vaginal discharge  No new sexual partners, is sexually active  Because of the severity of pain she came in for evaluation  PE:  Vitals:    12/09/19 0401 12/09/19 0609   BP: 139/87 136/81   BP Location: Right arm Right arm   Pulse: 90 85   Resp: 18 18   Temp: 98 3 °F (36 8 °C)    TempSrc: Oral    SpO2: 98% 98%   Weight: 65 8 kg (145 lb)    Height: 5' (1 524 m)    General: VSS, NAD, awake, alert  Well-nourished, well-developed  Appears stated age  Speaking normally in full sentences  Head: Normocephalic, atraumatic, nontender  Eyes: PERRL, EOM-I  No diplopia  No hyphema  No subconjunctival hemorrhages    Symmetrical lids    ENT: Atraumatic external nose and ears  MMM  No malocclusion  No stridor  Normal phonation  No drooling  Normal swallowing  Neck: Symmetric, trachea midline  No JVD  CV: RRR  +S1/S2  No murmurs or gallops  Peripheral pulses +2 throughout  No chest wall tenderness  Lungs:   Unlabored No retractions  CTAB, lungs sounds equal bilateral    No tachypnea  Abd: +BS, soft, focal LLQ tenderness w/o rebound/guarding  Rest of abdomen non-tender  No CVAT  No heel strike  No obturator  MSK:   FROM   Back:   No rashes  Skin: Dry, intact  Neuro: AAOx3, GCS 15, CN II-XII grossly intact  Motor grossly intact  Psychiatric/Behavioral: Appropriate mood and affect   Exam: deferred  A:  - LLQ pain  P:  - U/S for ovarian disease, IUD placement  - Pregnancy test    - Pelvic exam for PID if negative  - 13 point ROS was performed and all are normal unless stated in the history above  - Nursing note reviewed  Vitals reviewed  - Orders placed by myself and/or advanced practitioner / resident     - Previous chart was reviewed  - No language barrier    - History obtained from patient  - There are no limitations to the history obtained  - Critical care time: Not applicable for this patient  Code Status: No Order  Advance Directive and Living Will:      Power of :    POLST:      Final Diagnosis:  1  Left lower quadrant abdominal pain    2  IUD complication [facility])        ED Course as of Dec 09 0649   Mon Dec 09, 2019   0459 PREGNANCY TEST URINE: Negative     Medications - No data to display  US pelvis complete non OB   Final Result       Low-lying intrauterine device within the cervical canal extending into the vaginal vault         No evidence of torsion                      Workstation performed: [id number]           Orders Placed This Encounter   Procedures    Chlamydia/GC amplified DNA by PCR    US pelvis complete non OB    POCT pregnancy, urine     Labs Reviewed   POCT PREGNANCY, URINE - Normal Result Value Ref Range Status    EXT PREG TEST UR (Ref: Negative) Negative   Final    Control Valid   Final   CHLAMYDIA /GC AMPLIFIED DNA     Time reflects when diagnosis was documented in both MDM as applicable and the Disposition within this note     Time User Action Codes Description Comment    2019  6:41 AM Malcolm Brandt Add [R10 32] Left lower quadrant abdominal pain     2019  6:42 AM Malcolm Brandt Add [T83  9XXA] IUD complication [facility])       ED Disposition     ED Disposition Condition Date/Time Comment    Discharge Stable Mon Dec 9, 2019  6:41 AM Tony Bailey discharge to home/self care  Follow-up Information     Follow up With Specialties Details Why [facility] Obstetrics and Gynecology Schedule an appointment as soon as possible for a visit   [address] [place] [address] [phone]        Patient's Medications   Discharge Prescriptions    METRONIDAZOLE (FLAGYL) 500 MG TABLET    Take 1 tablet (500 mg total) by mouth every 12 (twelve) hours for 7 days       Start Date: 2019 End Date: 2019       Order Dose: 500 mg       Quantity: 14 tablet    Refills: 0     No discharge procedures on file  Prior to Admission Medications   Prescriptions Last Dose Informant Patient Reported? Taking?   levonorgestrel (MIRENA) 20 MCG/24HR IUD   Yes Yes   Si each by Intrauterine route once      Facility-Administered Medications: None       Portions of the record may have been created with voice recognition software  Occasional wrong word or "sound a like" substitutions may have occurred due to the inherent limitations of voice recognition software  Read the chart carefully and recognize, using context, where substitutions have occurred      Electronically signed by:  Alexandru Bledsoe

## 2019-12-30 ENCOUNTER — TELEPHONE (OUTPATIENT)
Dept: OBGYN CLINIC | Facility: CLINIC | Age: 29
End: 2019-12-30

## 2020-02-03 NOTE — TELEPHONE ENCOUNTER
Please call patient  Lotrisone was prescribed 1/2019 for external irritation from yeast infection  If she is having new symptoms of yeast she can try OTC monistat  Please review siding scale with Martha and offer her an appointment     Thank you

## 2020-02-03 NOTE — TELEPHONE ENCOUNTER
Left voicemail for pt to call our office to schedule an appointment for new symptoms/onset  Encouraged pt to try OTC medication  Offered our financial counselor's name and phone number for any assistance

## 2020-02-14 NOTE — TELEPHONE ENCOUNTER
Left voicemail for pt to try OTC monistat per provider's recommendations  Pt can call and schedule an appointment  Last seen 01/2019  Explained [facility] sliding fee available  Clarified is she is requesting psoriasis cream, please contact her family doctor   We do not prescribe psoriasis cream

## 2020-02-28 ENCOUNTER — OFFICE VISIT (OUTPATIENT)
Dept: OBGYN CLINIC | Facility: CLINIC | Age: 30
End: 2020-02-28

## 2020-02-28 VITALS
HEIGHT: 60 IN | WEIGHT: 160 LBS | DIASTOLIC BLOOD PRESSURE: 62 MMHG | BODY MASS INDEX: 31.41 KG/M2 | SYSTOLIC BLOOD PRESSURE: 118 MMHG | HEART RATE: 82 BPM

## 2020-02-28 DIAGNOSIS — Z30.432 ENCOUNTER FOR IUD REMOVAL: Primary | ICD-10-CM

## 2020-02-28 DIAGNOSIS — N76.0 BV (BACTERIAL VAGINOSIS): ICD-10-CM

## 2020-02-28 DIAGNOSIS — B96.89 BV (BACTERIAL VAGINOSIS): ICD-10-CM

## 2020-02-28 PROBLEM — J00 ACUTE NASOPHARYNGITIS: Status: RESOLVED | Noted: 2019-11-08 | Resolved: 2020-02-28

## 2020-02-28 LAB
BV WHIFF TEST VAG QL: POSITIVE
CLUE CELLS SPEC QL WET PREP: ABNORMAL
PH SMN: 5 [PH]
SL AMB POCT URINE HCG: NEGATIVE
SL AMB POCT WET MOUNT: POSITIVE
T VAGINALIS VAG QL WET PREP: ABNORMAL
YEAST VAG QL WET PREP: NEGATIVE

## 2020-02-28 PROCEDURE — 81025 URINE PREGNANCY TEST: CPT | Performed by: NURSE PRACTITIONER

## 2020-02-28 PROCEDURE — 58301 REMOVE INTRAUTERINE DEVICE: CPT | Performed by: NURSE PRACTITIONER

## 2020-02-28 PROCEDURE — 3008F BODY MASS INDEX DOCD: CPT | Performed by: NURSE PRACTITIONER

## 2020-02-28 PROCEDURE — 1036F TOBACCO NON-USER: CPT | Performed by: NURSE PRACTITIONER

## 2020-02-28 PROCEDURE — 87210 SMEAR WET MOUNT SALINE/INK: CPT | Performed by: NURSE PRACTITIONER

## 2020-02-28 RX ORDER — METRONIDAZOLE 500 MG/1
500 TABLET ORAL EVERY 12 HOURS SCHEDULED
Qty: 14 TABLET | Refills: 0 | Status: SHIPPED | OUTPATIENT
Start: 2020-02-28 | End: 2020-03-06

## 2020-02-28 NOTE — TELEPHONE ENCOUNTER
Pt left voicemail in regards to Lotrisone refill  She forgot to ask the provider for a refill at her appointment  Could a refill be sent to her pharmacy on file?    Thank you,  Barbara Earl RN

## 2020-02-28 NOTE — PROGRESS NOTES
Assessment/Plan:      Diagnoses and all orders for this visit:    Encounter for IUD removal  -     POCT urine HCG  -     Iud removal    BV (bacterial vaginosis)  -     metroNIDAZOLE (FLAGYL) 500 mg tablet; Take 1 tablet (500 mg total) by mouth every 12 (twelve) hours for 7 days      -reviewed diagnosis of bacterial vaginosis and treatment with metronidazole  Written information provided  Patient verbalized understanding to avoid alcohol use while taking medication  -reviewed safe sexual practices including consistent condom use  Patient encouraged to abstain from sexual activity until replacement of IUD  -met with social work at today's visit to fill out arch program information  -unknown Pap smear, patient denies history of abnormal Pap smears  Reviewed with patient she needs appointment for annual exam with Pap smear    RTO for annual exam with Pap smear, Mirena IUD placement once approved by arch    Subjective:     Patient ID: Kaelyn Kimble is a 30 y o  female  HPI P2 here with complaints of vaginal discharge intermittent for the past 3 weeks  Discharge is described as thin, white with a foul odor  Admits to external itching intermittently  Has tried over-the-counter Monistat with minimal improvement  Denies alleviating factors  Aggravating factors include intercourse, showering, being hot sweaty  Has had similar symptoms in the past   Denies new partners, pelvic pain, fever, chills bowel or bladder concerns  Pap smear-patient is unsure, denies history of abnormal Pap smears  Had Gardasil vaccine series    Review of Systems   Constitutional: Negative for chills and fever  Respiratory: Negative  Cardiovascular: Negative  Genitourinary: Positive for vaginal discharge  Negative for difficulty urinating, dysuria, enuresis, frequency, genital sores, pelvic pain, urgency, vaginal bleeding and vaginal pain           Objective:     Physical Exam   Constitutional: She is oriented to person, place, and time  She appears well-developed and well-nourished  Cardiovascular: Normal rate, regular rhythm and normal heart sounds  Pulmonary/Chest: Effort normal and breath sounds normal    Genitourinary: Uterus normal  There is no rash, tenderness, lesion or injury on the right labia  There is no rash, tenderness, lesion or injury on the left labia  Cervix exhibits no motion tenderness, no discharge and no friability  Right adnexum displays no mass, no tenderness and no fullness  Left adnexum displays no mass, no tenderness and no fullness  There is bleeding in the vagina  No erythema or tenderness in the vagina  There is a foreign body in the vagina  No signs of injury around the vagina  Vaginal discharge found  Genitourinary Comments: Mirena IUD can be seen protruding from external cervical os  IUD removed today  Patient verbalized understanding of need for removal due to malposition  Moderate amount of thin white vaginal discharge noted small amount of menstrual blood noted on exam    Neurological: She is alert and oriented to person, place, and time  Psychiatric: She has a normal mood and affect   Her behavior is normal  Thought content normal

## 2020-02-28 NOTE — PROGRESS NOTES
Iud removal  Date/Time: 2/28/2020 12:25 PM  Performed by: NELY Sow  Authorized by: NELY Sow     Consent:     Consent obtained:  Verbal    Consent given by:  Patient    Procedure risks and benefits discussed: yes      Patient questions answered: yes      Patient agrees, verbalizes understanding, and wants to proceed: yes      Educational handouts given: yes      Instructions and paperwork completed: yes    Universal protocol:     Patient states understanding of procedure being performed: yes      Relevant documents present and verified: yes      Test results available and properly labeled: na       Imaging studies available: yes      Required blood products, implants, devices, and special equipment available: yes      Site marked: no    Procedure:     Removed with no complications: yes      Removal due to mechanical complications of IUD: yes      Other reason for removal:  Mirena IUD extending through external cervical os  Comments:      Reviewed with patient Mirena IUD can be seen extending through external cervical os  Reviewed with patient Mirena IUD is malpositioned and requires removal   Patient verbalizes and is agreeable to removal today  Removed intact without difficulty  Patient was sent to social work to fill out paperwork for [facility] program for replacement IUD  Signs and symptoms to report reviewed  Safe sexual practices including consistent condom use encouraged  Patient encouraged to remain abstinent until placement of next IUD

## 2020-02-28 NOTE — PATIENT INSTRUCTIONS
Metronidazole (By mouth)   Metronidazole (met-joanne-LOLI-da-zole)  Treats bacterial infections  Brand Name(s): Flagyl, Flagyl 375   There may be other brand names for this medicine  When This Medicine Should Not Be Used: This medicine is not right for everyone  Do not use if you had an allergic reaction to metronidazole or similar medicines  Do not use this medicine to treat trichomoniasis if you are in the first 3 months of pregnancy  How to Use This Medicine:   Capsule, Tablet, Long Acting Tablet  · Take this medicine as directed, and take it at the same time each day  · Capsule or Tablet: Take with food or milk to avoid stomach upset  · Extended-release tablet: Take it on an empty stomach, 1 hour before or 2 hours after a meal   · Swallow the extended-release tablet whole  Do not crush, break, or chew it  · Take all of the medicine in your prescription to clear up your infection, even if you feel better after the first few doses  · Missed dose: Take a dose as soon as you remember  If it is almost time for your next dose, wait until then and take a regular dose  Do not take extra medicine to make up for a missed dose  · Store the medicine in a closed container at room temperature, away from heat, moisture, and direct light  Drugs and Foods to Avoid:   Ask your doctor or pharmacist before using any other medicine, including over-the-counter medicines, vitamins, and herbal products  · Do not use this medicine if you have taken disulfiram within the last 2 weeks  Do not drink alcohol or use medicine that contains alcohol or propylene glycol while using this medicine and for at least 3 days after you have finished metronidazole treatment  · Some foods and medicines can affect how metronidazole works  Tell your doctor if you are using busulfan, cimetidine, lithium, phenobarbital, phenytoin, or warfarin or another blood thinner    Warnings While Using This Medicine:   · Tell your doctor if you are pregnant or breastfeeding, or if you have kidney disease, liver disease, blood or bone marrow problems, oral thrush, yeast infection, or a history of seizures  · This medicine may cause the following problems:  ¨ Brain or nervous system problems, including seizures, meningitis, or vision problems  · Trichomoniasis treatment:  Your doctor may want to also treat your sexual partner, even if he or she has no symptoms  Also, you may want to use a condom during sexual intercourse  These measures will help keep you from getting the infection back again from your partner  If you have any questions, ask your doctor  · Call your doctor if your symptoms do not improve or if they get worse  · Your doctor will do lab tests at regular visits to check on the effects of this medicine  Keep all appointments  · Tell any doctor or dentist who treats you that you are using this medicine  This medicine may affect certain medical test results  · Keep all medicine out of the reach of children  Never share your medicine with anyone  Possible Side Effects While Using This Medicine:   Call your doctor right away if you notice any of these side effects:  · Allergic reaction: Itching or hives, swelling in your face or hands, swelling or tingling in your mouth or throat, chest tightness, trouble breathing  · Confusion, drowsiness, fever, headache, loss of appetite, nausea or vomiting, stiff neck or back  · Dizziness, problems with muscle control, clumsiness, shakiness, trouble talking  · Fever, chills, cough, sore throat, and body aches  · Numbness, tingling, or burning pain in your hands, arms, legs, or feet  · Seizures  If you notice these less serious side effects, talk with your doctor:   · Mild nausea  · Unusual or unpleasant taste in your mouth  If you notice other side effects that you think are caused by this medicine, tell your doctor  Call your doctor for medical advice about side effects   You may report side effects to FDA at 1-800-FDA-1088  © 2017 [address] Information is for End User's use only and may not be sold, redistributed or otherwise used for commercial purposes  The above information is an  only  It is not intended as medical advice for individual conditions or treatments  Talk to your doctor, nurse or pharmacist before following any medical regimen to see if it is safe and effective for you  Bacterial Vaginosis   WHAT YOU NEED TO KNOW:   What is bacterial vaginosis? Bacterial vaginosis (BV) is an infection in the vagina  It may cause vaginitis, which is irritation and inflammation of the vagina  What causes bacterial vaginosis? The cause of BV is not known  With BV, there is an imbalance in bacteria normally found in the vagina  Your risk for BV increases if you are sexually active  Your risk for BV also increases if you douche or have an intrauterine device (IUD)  What are the signs and symptoms of bacterial vaginosis? Some women have no symptoms  You may have the following:  · White, gray, or yellow vaginal discharge    · Vaginal discharge that smells like fish    · Itching or burning around the outside of your vagina  How is bacterial vaginosis diagnosed? Your healthcare provider will examine you and ask if you have other health conditions  He may need to take a sample of fluid from your vagina  This will be tested for the bacteria that causes BV  How is bacterial vaginosis treated? Antibiotics are given to kill the bacteria that cause BV  They may be given as a pill or as a cream to put in your vagina  Take or use as directed  What are the risks of bacterial vaginosis? If untreated, BV may spread and lead to serious infections in your uterus and fallopian tubes  This can make it more difficult to get pregnant  BV increases your risk for other sexually transmitted infections (STIs), such as chlamydia, gonorrhea, or HIV  How can I prevent bacterial vaginosis?    · Keep your vaginal area clean and dry:  Wear underwear and pantyhose with a cotton crotch  Wipe from front to back after you urinate or have a bowel movement  After bathing, rinse soap from your vaginal area to decrease your risk for irritation  · Do not use products that cause irritation:  Always use unscented tampons or sanitary pads  Do not use feminine sprays, powders, or scented tampons because they may cause irritation and increase your risk of BV  Detergents and fabric softeners may also cause irritation  · Do not douche: This can cause an imbalance in healthy vaginal bacteria  · Use latex condoms: This helps prevent another infection and keeps your partner from getting the infection  When should I contact my healthcare provider? · Your symptoms come back or do not improve with treatment  · You have vaginal bleeding that is not your monthly period  · You have questions or concerns about your condition or care  CARE AGREEMENT:   You have the right to help plan your care  Learn about your health condition and how it may be treated  Discuss treatment options with your caregivers to decide what care you want to receive  You always have the right to refuse treatment  The above information is an  only  It is not intended as medical advice for individual conditions or treatments  Talk to your doctor, nurse or pharmacist before following any medical regimen to see if it is safe and effective for you  © 2017 [address] Information is for End User's use only and may not be sold, redistributed or otherwise used for commercial purposes  All illustrations and images included in CareNotes® are the copyrighted property of [employer]  or Sammy Yuen  Safe Sex   WHAT YOU NEED TO KNOW:   What is safe sex? Safe sex is a combination of practices you can do to prevent pregnancy and the spread of sexually transmitted infections (STIs)   These practices help to decrease or prevent the exchange of body fluids during sexual contact  Body fluids include saliva, urine, blood, vaginal fluids, and semen  All types of sex can cause STIs  This includes oral, vaginal, and anal sex  How do I practice safe sex? Talk to your partner before you have sex  Ask about his or her sexual history and any current or past STI  · Use condoms and barrier methods for all types of sexual contact  Use a new condom or latex barrier each time you have sex  This includes oral, vaginal, and anal sex  Make sure that the condom fits and is put on correctly  Rubber latex sheets or dental dams can be used for oral sex  Ask your healthcare provider how to use these items and where to purchase them  If you are allergic to latex, use a nonlatex product such as polyurethane  · Limit your number of sexual partners  More than one sex partner can increase your risk for an STI  Do not have sex with anyone whose sexual history you do not know  · Do not do activities that can pass germs  Do not use saliva as a lubricant or share sex toys  · Tell your sex partner if you have an STI  Your partner may need to be tested and treated  Do not have sex while you are being treated for an STI, or with a partner who is being treated  · Get tested regularly for STIs  Get tested if you have had sexual contact with someone who has an STI  Get tested if you have unprotected sex with any new partner  · Get vaccinated  Vaccines may help to lower your risk for an STI such as HPV, hepatitis A, or hepatitis B  Ask your healthcare provider for more information on vaccines  How else can I practice safe sex? · Only use water-based lubricants during sex  Water-based lubricants may prevent sores or cuts in the vagina or penis  Prevent sores or cuts to decrease your risk for an STI  Do not use oil-based lubricants, such as baby oil or hand lotion, with latex condoms or barriers   These will weaken the latex and may cause it to break  · Do not use chemical irritants on condoms or genitals  Products that contain chemical irritants, such as spermicides, can irritate the lining of your vagina or rectum  Irritation may cause sores that may increase your risk for an STI  · Be careful when you have sex if you have open sores or cuts  Open sores or cuts may increase your risk for an STI  This includes new piercings and tattoos  Keep all open sores or cuts covered during sex  Do not have oral sex if you have cuts or sores in your mouth  Ask your healthcare provider when it is safe to have sex after you get a tattoo or piercing  · Do not use alcohol or drugs before sex  These substances can prevent you from thinking clearly and increase your risk for unsafe sex  Where can I find more information? · [id number] (AURELIO)  P O  [facility] , [address] [place] [address]  Web Address: http://[employer]/  org  When should I seek immediate care? · A condom breaks, leaks, or slips off while you are having sex  · You notice sores on your penis, vagina, anal area, or the skin around them  · You have had unsafe sex and want to discuss emergency contraception or treatment for STI exposure  When should I contact my healthcare provider? · You think you might be pregnant  · You have questions or concerns about your condition or care  CARE AGREEMENT:   Informed consent  is a legal document that explains the tests, treatments, or procedures that you may need  Informed consent means you understand what will be done and can make decisions about what you want  You give your permission when you sign the consent form  You can have someone sign this form for you if you are not able to sign it  You have the right to understand your medical care in words you know  Before you sign the consent form, understand the risks and benefits of what will be done  Make sure all your questions are answered   The above information is an  only  It is not intended as medical advice for individual conditions or treatments  Talk to your doctor, nurse or pharmacist before following any medical regimen to see if it is safe and effective for you  © 2017 [address] Information is for End User's use only and may not be sold, redistributed or otherwise used for commercial purposes  All illustrations and images included in CareNotes® are the copyrighted property of [employer] M [employer] Inc  or Sammy Yuen

## 2020-03-02 ENCOUNTER — PATIENT OUTREACH (OUTPATIENT)
Dept: OBGYN CLINIC | Facility: CLINIC | Age: 30
End: 2020-03-02

## 2020-03-02 ENCOUNTER — TELEPHONE (OUTPATIENT)
Dept: OTHER | Facility: OTHER | Age: 30
End: 2020-03-02

## 2020-03-02 NOTE — TELEPHONE ENCOUNTER
Left voicemail for the pt stating that our provider suggested she call her family doctor for lotrisone cream

## 2020-03-02 NOTE — PROGRESS NOTES
Late Entry Gynecology 2/28/20 JUNIE met with 28 y/o-S-P2- English speaking woman to assist with [facility] program  Pt has no medical insurance and is interested in on IUD  JUNIE discussed [facility] program and an application was completed  Pt brought income verification today and the application was faxed  Pt aware of waiting period  Pt denies other concern

## 2020-03-04 ENCOUNTER — TELEPHONE (OUTPATIENT)
Dept: OBGYN CLINIC | Facility: CLINIC | Age: 30
End: 2020-03-04

## 2020-04-08 ENCOUNTER — TELEPHONE (OUTPATIENT)
Dept: OBGYN CLINIC | Facility: CLINIC | Age: 30
End: 2020-04-08

## 2020-04-10 ENCOUNTER — PROCEDURE VISIT (OUTPATIENT)
Dept: OBGYN CLINIC | Facility: CLINIC | Age: 30
End: 2020-04-10

## 2020-04-10 VITALS
HEART RATE: 97 BPM | BODY MASS INDEX: 31.61 KG/M2 | TEMPERATURE: 98.8 F | SYSTOLIC BLOOD PRESSURE: 122 MMHG | HEIGHT: 60 IN | DIASTOLIC BLOOD PRESSURE: 80 MMHG | WEIGHT: 161 LBS

## 2020-04-10 DIAGNOSIS — Z30.430 ENCOUNTER FOR IUD INSERTION: Primary | ICD-10-CM

## 2020-04-10 DIAGNOSIS — N90.89 VULVAR IRRITATION: ICD-10-CM

## 2020-04-10 LAB — SL AMB POCT URINE HCG: NEGATIVE

## 2020-04-10 PROCEDURE — 3008F BODY MASS INDEX DOCD: CPT | Performed by: NURSE PRACTITIONER

## 2020-04-10 PROCEDURE — 81025 URINE PREGNANCY TEST: CPT | Performed by: NURSE PRACTITIONER

## 2020-04-10 PROCEDURE — 58300 INSERT INTRAUTERINE DEVICE: CPT | Performed by: NURSE PRACTITIONER

## 2020-04-10 PROCEDURE — 1036F TOBACCO NON-USER: CPT | Performed by: NURSE PRACTITIONER

## 2020-04-10 PROCEDURE — 99213 OFFICE O/P EST LOW 20 MIN: CPT | Performed by: NURSE PRACTITIONER

## 2020-04-10 RX ORDER — CLOTRIMAZOLE AND BETAMETHASONE DIPROPIONATE 10; .64 MG/G; MG/G
CREAM TOPICAL 2 TIMES DAILY
Qty: 30 G | Refills: 0 | Status: SHIPPED | OUTPATIENT
Start: 2020-04-10

## 2020-06-18 ENCOUNTER — OFFICE VISIT (OUTPATIENT)
Dept: OBGYN CLINIC | Facility: CLINIC | Age: 30
End: 2020-06-18

## 2020-06-18 VITALS
TEMPERATURE: 99 F | SYSTOLIC BLOOD PRESSURE: 128 MMHG | HEIGHT: 60 IN | DIASTOLIC BLOOD PRESSURE: 83 MMHG | HEART RATE: 106 BPM | BODY MASS INDEX: 32.79 KG/M2 | WEIGHT: 167 LBS

## 2020-06-18 DIAGNOSIS — Z12.4 SCREENING FOR CERVICAL CANCER: ICD-10-CM

## 2020-06-18 DIAGNOSIS — Z01.419 VISIT FOR GYNECOLOGIC EXAMINATION: Primary | ICD-10-CM

## 2020-06-18 DIAGNOSIS — T83.32XA INTRAUTERINE CONTRACEPTIVE DEVICE THREADS LOST, INITIAL ENCOUNTER: ICD-10-CM

## 2020-06-18 PROCEDURE — G0145 SCR C/V CYTO,THINLAYER,RESCR: HCPCS | Performed by: OBSTETRICS & GYNECOLOGY

## 2020-06-18 PROCEDURE — 1036F TOBACCO NON-USER: CPT | Performed by: OBSTETRICS & GYNECOLOGY

## 2020-06-18 PROCEDURE — 99395 PREV VISIT EST AGE 18-39: CPT | Performed by: OBSTETRICS & GYNECOLOGY

## 2020-06-18 PROCEDURE — 3008F BODY MASS INDEX DOCD: CPT | Performed by: OBSTETRICS & GYNECOLOGY

## 2020-06-22 ENCOUNTER — TRANSCRIBE ORDERS (OUTPATIENT)
Dept: RADIOLOGY | Facility: HOSPITAL | Age: 30
End: 2020-06-22

## 2020-06-22 ENCOUNTER — HOSPITAL ENCOUNTER (OUTPATIENT)
Dept: RADIOLOGY | Facility: HOSPITAL | Age: 30
Discharge: HOME | End: 2020-06-22

## 2020-06-22 DIAGNOSIS — T83.32XA INTRAUTERINE CONTRACEPTIVE DEVICE THREADS LOST, INITIAL ENCOUNTER: ICD-10-CM

## 2020-06-22 PROCEDURE — 76830 TRANSVAGINAL US NON-OB: CPT

## 2020-06-22 PROCEDURE — 76856 US EXAM PELVIC COMPLETE: CPT

## 2020-06-25 LAB
LAB AP GYN PRIMARY INTERPRETATION: NORMAL
Lab: NORMAL

## 2020-06-26 ENCOUNTER — TELEPHONE (OUTPATIENT)
Dept: OBGYN CLINIC | Facility: CLINIC | Age: 30
End: 2020-06-26

## 2020-07-23 ENCOUNTER — TELEPHONE (OUTPATIENT)
Dept: OBGYN CLINIC | Facility: CLINIC | Age: 30
End: 2020-07-23

## 2020-07-23 NOTE — TELEPHONE ENCOUNTER
Patient left message on nurse line requesting refill for cream   Called patient and left message need to schedule appointment    Patient to call back to schedule

## 2021-01-10 ENCOUNTER — HOSPITAL ENCOUNTER (EMERGENCY)
Facility: HOSPITAL | Age: 31
Discharge: HOME | End: 2021-01-10
Attending: EMERGENCY MEDICINE

## 2021-01-10 VITALS
SYSTOLIC BLOOD PRESSURE: 140 MMHG | TEMPERATURE: 99 F | OXYGEN SATURATION: 97 % | HEART RATE: 80 BPM | DIASTOLIC BLOOD PRESSURE: 73 MMHG | RESPIRATION RATE: 16 BRPM

## 2021-01-10 DIAGNOSIS — T78.40XA ALLERGIC REACTION, INITIAL ENCOUNTER: Primary | ICD-10-CM

## 2021-01-10 DIAGNOSIS — R35.0 URINARY FREQUENCY: ICD-10-CM

## 2021-01-10 LAB
BILIRUB UR QL STRIP: NEGATIVE
CLARITY UR: CLEAR
COLOR UR: YELLOW
COLOR, POC: NORMAL
GLUCOSE UR STRIP-MCNC: NEGATIVE MG/DL
HGB UR QL STRIP.AUTO: NEGATIVE
KETONES UR STRIP-MCNC: NEGATIVE MG/DL
LEUKOCYTE ESTERASE UR QL STRIP: NEGATIVE
NITRITE UR QL STRIP: NEGATIVE
PH UR STRIP.AUTO: 7 [PH] (ref 4.5–8)
PROT UR STRIP-MCNC: NEGATIVE MG/DL
SP GR UR STRIP.AUTO: <=1.005 (ref 1–1.03)
UROBILINOGEN UR QL STRIP.AUTO: 0.2 E.U./DL

## 2021-01-10 PROCEDURE — 99284 EMERGENCY DEPT VISIT MOD MDM: CPT

## 2021-01-10 PROCEDURE — 81003 URINALYSIS AUTO W/O SCOPE: CPT

## 2021-01-10 PROCEDURE — 99282 EMERGENCY DEPT VISIT SF MDM: CPT | Performed by: EMERGENCY MEDICINE

## 2021-01-10 NOTE — ED PROVIDER NOTES
History  Chief Complaint   Patient presents with    Allergic Reaction     pt took Macrobid this morning and states she had and allergic reaction  pt took 25mg of Benadryl at home  per EMS pt face and lips were swollen and gave 50 Benadryl by EMS  Allison Talley is a 35y o  year old female presenting to the [facility] [place] ED for allergic reaction  Patient presents to the emergency department with 2 hours of right-sided periorbital and lip swelling  Symptoms started 1 hour after taking previously prescribed Macrobid  The patient took macrobid because she has had 1 day of urinary frequency and incontinence after coughing  The Macrobid was prescribed months ago for UTI and patient previously tolerated full course of Macrobid without allergic reaction  The patient states she had some shortness of breath that has since resolved  Patient took 25 mg Benadryl and also received 50 mg Benadryl from EMS PTA  Upon ED evaluation, the patient states that swelling and periorbital edema have resolved  Minimal residual itching in extremities  Currently no dyspnea, chest tightness or facial swelling  Patient denies vaginal bleeding, discharge or dysuria/hematuria  No flank pain  No history of similar reaction previously and no known drug allergies  History provided by:  Patient and EMS personnel   used: No        Prior to Admission Medications   Prescriptions Last Dose Informant Patient Reported? Taking?    clotrimazole-betamethasone (LOTRISONE) 1-0 05 % cream   No Yes   Sig: Apply topically 2 (two) times a day   levonorgestrel (MIRENA) 20 MCG/24HR IUD   Yes Yes   Si each by Intrauterine route once      Facility-Administered Medications: None       Past Medical History:   Diagnosis Date    ADHD     Anemia     with pregnancy     Anxiety     Depression     Gestational diabetes     2017    History of migraine headaches     PTSD (post-traumatic stress disorder)        Past Surgical History:   Procedure Laterality Date    NO PAST SURGERIES         Family History   Problem Relation Age of Onset    Anxiety disorder Mother     No Known Problems Father     Anxiety disorder Sister     Anxiety disorder Brother     Other Daughter         non-ketotic hyperglycemia      I have reviewed and agree with the history as documented  E-Cigarette/Vaping    E-Cigarette Use Current Every Day User     Start Date 1/1/19      E-Cigarette/Vaping Substances    Nicotine Yes     THC No     CBD Yes     Flavoring Yes     Other No     Unknown No      Social History     Tobacco Use    Smoking status: Never Smoker    Smokeless tobacco: Never Used   Substance Use Topics    Alcohol use: Not Currently    Drug use: No        Review of Systems   Constitutional: Negative for chills and fever  HENT: Positive for facial swelling  Negative for congestion, rhinorrhea and trouble swallowing  Eyes: Positive for redness and itching  Negative for photophobia and visual disturbance  Respiratory: Positive for shortness of breath  Negative for cough, choking, chest tightness and wheezing  Cardiovascular: Negative for chest pain and leg swelling  Gastrointestinal: Negative for abdominal distention, abdominal pain, constipation, diarrhea, nausea and vomiting  Endocrine: Positive for polyuria  Genitourinary: Positive for frequency and urgency  Negative for difficulty urinating, dysuria, flank pain, hematuria, vaginal bleeding and vaginal discharge  Musculoskeletal: Negative for arthralgias  Skin: Positive for rash  Neurological: Negative for seizures, syncope and light-headedness  Psychiatric/Behavioral: Negative for behavioral problems and confusion  All other systems reviewed and are negative        Physical Exam  ED Triage Vitals   Temperature Pulse Respirations Blood Pressure SpO2   01/10/21 0616 01/10/21 0616 01/10/21 0657 01/10/21 0616 01/10/21 0616   99 °F (37 2 °C) 80 16 140/73 97 %      Temp src Heart Rate Source Patient Position - Orthostatic VS BP Location FiO2 (%)   -- -- 01/10/21 0616 01/10/21 0616 --     Lying Right arm       Pain Score       01/10/21 0616       No Pain             Orthostatic Vital Signs  Vitals:    01/10/21 0616   BP: 140/73   Pulse: 80   Patient Position - Orthostatic VS: Lying       Physical Exam  Vitals signs and nursing note reviewed  Constitutional:       General: She is not in acute distress  Appearance: Normal appearance  She is well-developed  She is not ill-appearing, toxic-appearing or diaphoretic  HENT:      Head: Normocephalic and atraumatic  Comments: No periorbital or alec oral swelling  No stridor  No airway obstruction  Mouth/Throat:      Mouth: No angioedema  Pharynx: Uvula midline  No pharyngeal swelling or uvula swelling  Eyes:      Pupils: Pupils are equal, round, and reactive to light  Neck:      Musculoskeletal: Neck supple  Cardiovascular:      Rate and Rhythm: Normal rate and regular rhythm  Pulses:           Radial pulses are 2+ on the right side and 2+ on the left side  Heart sounds: No murmur  Pulmonary:      Effort: Pulmonary effort is normal  No accessory muscle usage or respiratory distress  Breath sounds: Normal breath sounds  No stridor  No decreased breath sounds, wheezing, rhonchi or rales  Abdominal:      General: Bowel sounds are normal  There is no distension  Palpations: Abdomen is soft  Tenderness: There is abdominal tenderness in the suprapubic area  There is no right CVA tenderness, left CVA tenderness, guarding or rebound  Musculoskeletal:      Right lower leg: She exhibits no tenderness  No edema  Left lower leg: She exhibits no tenderness  No edema  Skin:     Capillary Refill: Capillary refill takes less than 2 seconds  Findings: No rash  Neurological:      Mental Status: She is alert and oriented to person, place, and time     Psychiatric:         Behavior: Behavior normal  ED Medications  Medications   diphenhydrAMINE (FOR EMS ONLY) (BENADRYL) injection 50 mg (0 mg Does not apply Given to EMS 1/10/21 0618)       Diagnostic Studies  Results Reviewed     Procedure Component Value Units Date/Time    POCT urinalysis dipstick [[id number]]  (Normal) Resulted: 01/10/21 0636    Lab Status: Final result Specimen: Urine Updated: 01/10/21 0636     Color, UA see results    Urine Macroscopic, POC [[id number]] Collected: 01/10/21 5620    Lab Status: Final result Specimen: Urine Updated: 01/10/21 0635     Color, UA Yellow     Clarity, UA Clear     pH, UA 7 0     Leukocytes, UA Negative     Nitrite, UA Negative     Protein, UA Negative mg/dl      Glucose, UA Negative mg/dl      Ketones, UA Negative mg/dl      Urobilinogen, UA 0 2 E U /dl      Bilirubin, UA Negative     Blood, UA Negative     Specific Gravity, UA <=1 005    Narrative:      CLINITEK RESULT                 No orders to display         Procedures  Procedures      ED Course  ED Course as of Jhon 10 0832   Sun Jhon 10, 2021   0652 Leukocytes, UA: Negative   0652 Nitrite, UA: Negative                                       MDM  Number of Diagnoses or Management Options  Allergic reaction, initial encounter:   Urinary frequency:   Diagnosis management comments: 35 y o  female presenting for allergic reaction  No airway compromise  Treated with Benadryl prior to arrival   Will observe in ED  Will also check urinalysis  Patient educated that this was not likely a true allergy to nitrofurantoin and likely due to  medication  Patient instructed not to take  antibiotics in the future  Urinalysis not consistent with UTI, will forego antibiotics at this time  The patient was provided a written after visit summary with strict RTED precautions  I have discussed with the patient our plan to discharge them from the ED and the patient is in agreement with this plan   I have also discussed with the patient plans for follow up with their PCP as needed  Amount and/or Complexity of Data Reviewed  Clinical lab tests: ordered and reviewed  Review and summarize past medical records: yes    Patient Progress  Patient progress: resolved      Disposition  Final diagnoses: Allergic reaction, initial encounter   Urinary frequency     Time reflects when diagnosis was documented in both MDM as applicable and the Disposition within this note     Time User Action Codes Description Comment    1/10/2021  6:28 AM Laverna Chiquito Add [T78 40XA] Allergic reaction, initial encounter     1/10/2021  6:53 AM Laverna Chiquito Add [R35 0] Urinary frequency       ED Disposition     ED Disposition Condition Date/Time Comment    Discharge Stable Sun Jhon 10, 2021  6:28 AM Chari Fabry discharge to home/self care  Follow-up Information     Follow up With Specialties Details Why [facility] Internal Medicine Call  To establish care  [address] [facility] [id number]  [address] [place] [address], [address], [id number]    [facility] Emergency Department Emergency Medicine Go to  If symptoms return  [facility]  [phone]  ED, [address], [address] [phone]          Discharge Medication List as of 1/10/2021  6:53 AM      CONTINUE these medications which have NOT CHANGED    Details   clotrimazole-betamethasone (LOTRISONE) 1-0 05 % cream Apply topically 2 (two) times a day, Starting Fri 4/10/2020, Normal      levonorgestrel (MIRENA) 20 MCG/24HR IUD 1 each by Intrauterine route once, Historical Med           No discharge procedures on file  PDMP Review     None           ED Provider  Attending physically available and evaluated Chari Fabry I managed the patient along with the ED Attending      Electronically Signed by Mira Silva 162, DO  01/10/21 5819

## 2021-01-10 NOTE — DISCHARGE INSTRUCTIONS
You have been seen for an allergic reaction and urinary frequency  Please complete the full course of antibiotics as prescribed  Return to the emergency department if you develop worsening facial swelling, trouble breathing, vomiting, flank pain or any other symptoms of concern  Please follow up with the [facility] clinic by calling the number provided

## 2021-01-10 NOTE — ED ATTENDING ATTESTATION
1/10/2021  IAquiles DO, saw and evaluated the patient  I have discussed the patient with the resident/non-physician practitioner and agree with the resident's/non-physician practitioner's findings, Plan of Care, and MDM as documented in the resident's/non-physician practitioner's note, except where noted  All available labs and Radiology studies were reviewed  I was present for key portions of any procedure(s) performed by the resident/non-physician practitioner and I was immediately available to provide assistance  At this point I agree with the current assessment done in the Emergency Department  I have conducted an independent evaluation of this patient a history and physical is as follows:    Patient is a 20-year-old female, says for the last 24 hours he has had some increased urinary frequency, thought she may have urinary tract infection so about 3:00 a m  She took a Macrobid that she had been prescribed from an old prescription  She said that she did not complete that prior prescription completely but left about 4 tablets in case she developed an infection in the future  She says in the past she has had no problems with Macrobid was starting about 5:30 a m  She began having some facial swelling, eye swelling, felt like she was having a reaction  She took 25 mg of oral Benadryl, called an ambulance who administered 50 mg of IV Benadryl  She says she is now feeling much better  No trouble breathing, no trouble swallowing, no fever, no chills  General:  Patient is well-appearing  Head:  Atraumatic  Eyes:  Conjunctiva pink  ENT:  Mucous membranes are moist, no or pharyngeal edema or swelling  Neck:  Supple, no stridor  Cardiac:  S1-S2, without murmurs  Lungs:  Clear to auscultation bilaterally  Abdomen:  Soft, nontender, normal bowel sounds, no CVA tenderness, no tympany, no rigidity, no guarding  Extremities:  Normal range of motion  Neurologic:  Awake, fluent speech, normal comprehension  AAOx3    Skin:  Pink warm and dry  Psychiatric:  Alert, pleasant, cooperative            ED Course     Labs Reviewed   POCT URINALYSIS DIPSTICK - Normal       Result Value Ref Range Status    Color, UA see results   Final   URINE MACROSCOPIC, POC    Color, UA Yellow   Final    Clarity, UA Clear   Final    pH, UA 7 0  4 5 - 8 0 Final    Leukocytes, UA Negative  Negative Final    Nitrite, UA Negative  Negative Final    Protein, UA Negative  Negative mg/dl Final    Glucose, UA Negative  Negative mg/dl Final    Ketones, UA Negative  Negative mg/dl Final    Urobilinogen, UA 0 2  0 2, 1 0 E U /dl E U /dl Final    Bilirubin, UA Negative  Negative Final    Blood, UA Negative  Negative Final    Specific Gravity, UA <=1 005  1 003 - 1 030 Final    Narrative:     CLINITEK RESULT       Urinalysis does not suggest infection, there is no glucose in the urine to suggest significant diabetic cause of her polyuria  Do not believe antibiotics at this point are indicated  Patient has improved clinically from her liver reaction  No signs of anaphylaxis  Do not believe she needs to be admitted or have prolonged observation and the patient is appropriate for discharge  Supportive care, importance of follow-up and return precautions were discussed with the patient, who expressed understanding        Critical Care Time  Procedures

## 2021-04-19 ENCOUNTER — OFFICE VISIT (OUTPATIENT)
Dept: OBGYN CLINIC | Facility: CLINIC | Age: 31
End: 2021-04-19

## 2021-04-19 VITALS
HEART RATE: 103 BPM | DIASTOLIC BLOOD PRESSURE: 92 MMHG | HEIGHT: 60 IN | WEIGHT: 168 LBS | BODY MASS INDEX: 32.98 KG/M2 | SYSTOLIC BLOOD PRESSURE: 150 MMHG

## 2021-04-19 DIAGNOSIS — B37.3 VULVAR CANDIDIASIS: Primary | ICD-10-CM

## 2021-04-19 PROBLEM — B37.31 VULVAR CANDIDIASIS: Status: ACTIVE | Noted: 2021-04-19

## 2021-04-19 PROCEDURE — 99213 OFFICE O/P EST LOW 20 MIN: CPT | Performed by: OBSTETRICS & GYNECOLOGY

## 2021-04-19 NOTE — PROGRESS NOTES
OB/GYN VISIT  Saira Freire  2021  6:32 PM    Subjective:     Saira Freire is a 35 y o   female who presents for evaluation of itchy vulvar rash  Clomitazole worked previously for similar rash  Gray-white vaginal discharge without associated odor  No new sexual partners  Uses Mirena IUD for contraception and does not use condoms  Uses baby body wash to cleanse pubic area and routinely shaves pubic hair  Does not douche  LMP was 20  Objective:    Vitals: Blood pressure 150/92, pulse 103, height 5' (1 524 m), weight 76 2 kg (168 lb)  Body mass index is 32 81 kg/m²  Physical Exam  Constitutional:       Appearance: Normal appearance  She is obese  Pulmonary:      Effort: Pulmonary effort is normal    Genitourinary:     Comments: Erythema and excoriations seen between labia minora and majora  No other lesions visualized  Cervix grossly normal  IUD strings NOT visualized  KOH and wet mount negative for hyphae/budding yeast and clue cells  Vaginal fluid pH normal   Musculoskeletal: Normal range of motion  Neurological:      General: No focal deficit present  Mental Status: She is alert and oriented to person, place, and time  Psychiatric:         Mood and Affect: Mood normal          Behavior: Behavior normal          Assessment/Plan:  Problem List Items Addressed This Visit        Genitourinary    Vulvar candidiasis - Primary     Terazol-7 to be used externally for 7 days  Hygiene practices reviewed including: frequently changing towels, use of unscented soaps for cleansing, cotton underwear only         Relevant Medications    terconazole (TERAZOL 7) 0 4 % vaginal cream          Patient discussed with Dr Arminda Plummer MD  2021  6:32 PM

## 2021-04-19 NOTE — ASSESSMENT & PLAN NOTE
Terazol-7 to be used externally for 7 days  Hygiene practices reviewed including: frequently changing towels, use of unscented soaps for cleansing, cotton underwear only

## 2021-04-29 ENCOUNTER — TELEPHONE (OUTPATIENT)
Dept: OBGYN CLINIC | Facility: CLINIC | Age: 31
End: 2021-04-29

## 2021-04-29 DIAGNOSIS — B37.3 VULVAR CANDIDIASIS: Primary | ICD-10-CM

## 2021-04-29 RX ORDER — CLOTRIMAZOLE 1 %
CREAM (GRAM) TOPICAL 2 TIMES DAILY
Qty: 30 G | Refills: 0 | Status: SHIPPED | OUTPATIENT
Start: 2021-04-29

## 2021-04-29 NOTE — TELEPHONE ENCOUNTER
Pt called stating Terconazole is not working for her and is making her skin feel hard  She said clotrimazole topical works better for her  Does she need to be re-evaluated ?

## 2021-05-17 ENCOUNTER — OFFICE VISIT (OUTPATIENT)
Dept: OBGYN CLINIC | Facility: CLINIC | Age: 31
End: 2021-05-17

## 2021-05-17 VITALS
HEIGHT: 60 IN | BODY MASS INDEX: 33.18 KG/M2 | WEIGHT: 169 LBS | DIASTOLIC BLOOD PRESSURE: 63 MMHG | HEART RATE: 89 BPM | SYSTOLIC BLOOD PRESSURE: 141 MMHG

## 2021-05-17 DIAGNOSIS — B37.3 VULVAR CANDIDIASIS: Primary | ICD-10-CM

## 2021-05-17 PROCEDURE — 99213 OFFICE O/P EST LOW 20 MIN: CPT | Performed by: OBSTETRICS & GYNECOLOGY

## 2021-05-17 RX ORDER — FLUCONAZOLE 150 MG/1
150 TABLET ORAL
Qty: 3 TABLET | Refills: 0 | Status: SHIPPED | OUTPATIENT
Start: 2021-05-17 | End: 2021-05-24

## 2021-05-17 NOTE — PROGRESS NOTES
OB/GYN VISIT  Vickie Forman  2021  4:44 PM      Subjective:     Vickie Forman is a 35 y o   female who presents for evaluation of vulvar pruritis  Patient was seen in April for vaginal infection  She was diagnosed with vulvar candidiasis and given rx for Terazol  Patient called shortly afterward and stated that the treatment was not working  She was given Clotrimazole  Patient states her pruritis is not improving and has intense itching, especially after a shower  She denies lesions or open sores, no new sexual partners, vaginal discharge, foul smelling discharge or urinary symptoms  She states the only thing that has helped her pruritis in the past was lotrisone  Patient denies any known autoimmune disorders or T2DM  Of note, patient has a history of GDM in all 3 of her pregnancies  Objective:    Vitals: Blood pressure 141/63, pulse 89, height 5' (1 524 m), weight 76 7 kg (169 lb), last menstrual period 2021  Body mass index is 33 01 kg/m²  Past Medical History:   Diagnosis Date    ADHD     Anemia     with pregnancy     Anxiety     Depression     Gestational diabetes     2017    History of migraine headaches     PTSD (post-traumatic stress disorder)      Past Surgical History:   Procedure Laterality Date    NO PAST SURGERIES         Physical Exam  Genitourinary:     Labia:         Right: Rash and tenderness present  No lesion or injury  Left: Rash and tenderness present  No lesion or injury  Vagina: No vaginal discharge, erythema, tenderness or bleeding  Cervix: No discharge, friability, lesion, erythema or cervical bleeding        Comments: Refer to picture below       KOH: Negative for pseudohyphae   Wet Prep: Negative for clue cells and trichomonads   PH 4 5            Assessment/Plan:    Vickie Forman is a 35 y o  [id number] female with recurrent vulvar candidiasis     Vulvar Candidiasis, Recurrent   Rash and symptoms are consistent with vulvar candidiasis   Patient failed treatment with clotrimazole and terazol   Discussed with patient that recurrent infection can be due to a resistant strain of yeast, undiagnosed diabetes or HIV  We discussed initaiting a treatment with oral medication and topical medications to help fight the infection  If this does not cure the infection, may consider biopsy for culture to define the type of yeast that is causing the infection  Will also test patient for T2DM and HIV  Patient has a history of GDM in all of her pregnancies and is at risk  Oral Fluconazole 150mg q3 days for 3 doses   Topical Miconazole   Triamcinolone ointment   Hgb A1c   HIV    Will call with results  Patient to call if her symptoms do not improve       Case discussed with Dr Joe Hendrickson MD  5/17/2021  4:44 PM

## 2021-06-21 PROBLEM — Z97.5 IUD (INTRAUTERINE DEVICE) IN PLACE: Status: ACTIVE | Noted: 2021-06-21

## 2022-11-16 ENCOUNTER — HOSPITAL ENCOUNTER (INPATIENT)
Facility: HOSPITAL | Age: 32
LOS: 6 days | Discharge: HOME | End: 2022-11-23
Attending: EMERGENCY MEDICINE | Admitting: PSYCHIATRY & NEUROLOGY

## 2022-11-16 DIAGNOSIS — Z72.0 VAPES NICOTINE CONTAINING SUBSTANCE: ICD-10-CM

## 2022-11-16 DIAGNOSIS — F33.3 MAJOR DEPRESSIVE DISORDER, RECURRENT, SEVERE WITH PSYCHOTIC FEATURES (HCC): Primary | Chronic | ICD-10-CM

## 2022-11-16 DIAGNOSIS — E55.9 VITAMIN D DEFICIENCY: ICD-10-CM

## 2022-11-16 DIAGNOSIS — F41.9 ANXIETY: ICD-10-CM

## 2022-11-16 DIAGNOSIS — Z00.8 MEDICAL CLEARANCE FOR PSYCHIATRIC ADMISSION: ICD-10-CM

## 2022-11-16 DIAGNOSIS — G47.00 INSOMNIA: ICD-10-CM

## 2022-11-16 LAB
AMPHETAMINES SERPL QL SCN: NEGATIVE
ATRIAL RATE: 110 BPM
BARBITURATES UR QL: NEGATIVE
BENZODIAZ UR QL: NEGATIVE
COCAINE UR QL: NEGATIVE
ETHANOL EXG-MCNC: 0 MG/DL
EXT PREGNANCY TEST URINE: NEGATIVE
EXT. CONTROL: NORMAL
METHADONE UR QL: NEGATIVE
OPIATES UR QL SCN: NEGATIVE
OXYCODONE+OXYMORPHONE UR QL SCN: POSITIVE
P AXIS: 24 DEGREES
PCP UR QL: NEGATIVE
PR INTERVAL: 116 MS
QRS AXIS: 64 DEGREES
QRSD INTERVAL: 72 MS
QT INTERVAL: 332 MS
QTC INTERVAL: 449 MS
T WAVE AXIS: 9 DEGREES
THC UR QL: NEGATIVE
VENTRICULAR RATE: 110 BPM

## 2022-11-16 RX ORDER — LORAZEPAM 1 MG/1
1 TABLET ORAL ONCE
Status: COMPLETED | OUTPATIENT
Start: 2022-11-16 | End: 2022-11-16

## 2022-11-16 RX ADMIN — Medication 10 MG: at 23:19

## 2022-11-16 RX ADMIN — LORAZEPAM 1 MG: 1 TABLET ORAL at 18:26

## 2022-11-17 PROBLEM — Z72.0 VAPES NICOTINE CONTAINING SUBSTANCE: Status: ACTIVE | Noted: 2022-11-17

## 2022-11-17 PROBLEM — Z00.8 MEDICAL CLEARANCE FOR PSYCHIATRIC ADMISSION: Status: ACTIVE | Noted: 2020-04-10

## 2022-11-17 PROBLEM — E66.811 OBESITY (BMI 30.0-34.9): Status: ACTIVE | Noted: 2022-11-17

## 2022-11-17 PROBLEM — E66.9 OBESITY (BMI 30.0-34.9): Status: ACTIVE | Noted: 2022-11-17

## 2022-11-17 LAB — SARS-COV-2 RNA RESP QL NAA+PROBE: NEGATIVE

## 2022-11-17 PROCEDURE — GZHZZZZ GROUP PSYCHOTHERAPY: ICD-10-PCS | Performed by: PSYCHIATRY & NEUROLOGY

## 2022-11-17 PROCEDURE — GZ59ZZZ INDIVIDUAL PSYCHOTHERAPY, PSYCHOPHYSIOLOGICAL: ICD-10-PCS | Performed by: PSYCHIATRY & NEUROLOGY

## 2022-11-17 RX ORDER — TRAZODONE HYDROCHLORIDE 50 MG/1
50 TABLET ORAL
Status: DISCONTINUED | OUTPATIENT
Start: 2022-11-17 | End: 2022-11-19

## 2022-11-17 RX ORDER — HYDROXYZINE 50 MG/1
100 TABLET, FILM COATED ORAL
Status: DISCONTINUED | OUTPATIENT
Start: 2022-11-17 | End: 2022-11-23 | Stop reason: HOSPADM

## 2022-11-17 RX ORDER — BENZTROPINE MESYLATE 1 MG/ML
1 INJECTION INTRAMUSCULAR; INTRAVENOUS
Status: DISCONTINUED | OUTPATIENT
Start: 2022-11-17 | End: 2022-11-23 | Stop reason: HOSPADM

## 2022-11-17 RX ORDER — HYDROXYZINE HYDROCHLORIDE 25 MG/1
25 TABLET, FILM COATED ORAL
Status: DISCONTINUED | OUTPATIENT
Start: 2022-11-17 | End: 2022-11-23 | Stop reason: HOSPADM

## 2022-11-17 RX ORDER — LORAZEPAM 2 MG/ML
1 INJECTION INTRAMUSCULAR
Status: DISCONTINUED | OUTPATIENT
Start: 2022-11-17 | End: 2022-11-23 | Stop reason: HOSPADM

## 2022-11-17 RX ORDER — BENZTROPINE MESYLATE 1 MG/ML
0.5 INJECTION INTRAMUSCULAR; INTRAVENOUS
Status: DISCONTINUED | OUTPATIENT
Start: 2022-11-17 | End: 2022-11-23 | Stop reason: HOSPADM

## 2022-11-17 RX ORDER — LORAZEPAM 2 MG/ML
2 INJECTION INTRAMUSCULAR
Status: DISCONTINUED | OUTPATIENT
Start: 2022-11-17 | End: 2022-11-23 | Stop reason: HOSPADM

## 2022-11-17 RX ORDER — DIPHENHYDRAMINE HYDROCHLORIDE 50 MG/ML
50 INJECTION INTRAMUSCULAR; INTRAVENOUS EVERY 6 HOURS PRN
Status: DISCONTINUED | OUTPATIENT
Start: 2022-11-17 | End: 2022-11-23 | Stop reason: HOSPADM

## 2022-11-17 RX ORDER — ACETAMINOPHEN 325 MG/1
650 TABLET ORAL EVERY 6 HOURS PRN
Status: DISCONTINUED | OUTPATIENT
Start: 2022-11-17 | End: 2022-11-23 | Stop reason: HOSPADM

## 2022-11-17 RX ORDER — HYDROXYZINE 50 MG/1
50 TABLET, FILM COATED ORAL
Status: DISCONTINUED | OUTPATIENT
Start: 2022-11-17 | End: 2022-11-23 | Stop reason: HOSPADM

## 2022-11-17 RX ORDER — PROPRANOLOL HYDROCHLORIDE 10 MG/1
10 TABLET ORAL EVERY 8 HOURS PRN
Status: DISCONTINUED | OUTPATIENT
Start: 2022-11-17 | End: 2022-11-23 | Stop reason: HOSPADM

## 2022-11-17 RX ORDER — BENZTROPINE MESYLATE 1 MG/1
1 TABLET ORAL
Status: DISCONTINUED | OUTPATIENT
Start: 2022-11-17 | End: 2022-11-23 | Stop reason: HOSPADM

## 2022-11-17 RX ORDER — MAGNESIUM HYDROXIDE/ALUMINUM HYDROXICE/SIMETHICONE 120; 1200; 1200 MG/30ML; MG/30ML; MG/30ML
30 SUSPENSION ORAL EVERY 4 HOURS PRN
Status: DISCONTINUED | OUTPATIENT
Start: 2022-11-17 | End: 2022-11-23 | Stop reason: HOSPADM

## 2022-11-17 RX ORDER — HALOPERIDOL 5 MG/ML
2.5 INJECTION INTRAMUSCULAR
Status: DISCONTINUED | OUTPATIENT
Start: 2022-11-17 | End: 2022-11-23 | Stop reason: HOSPADM

## 2022-11-17 RX ORDER — HALOPERIDOL 5 MG/ML
5 INJECTION INTRAMUSCULAR
Status: DISCONTINUED | OUTPATIENT
Start: 2022-11-17 | End: 2022-11-23 | Stop reason: HOSPADM

## 2022-11-17 RX ORDER — ACETAMINOPHEN 325 MG/1
650 TABLET ORAL EVERY 4 HOURS PRN
Status: DISCONTINUED | OUTPATIENT
Start: 2022-11-17 | End: 2022-11-23 | Stop reason: HOSPADM

## 2022-11-17 RX ORDER — POLYETHYLENE GLYCOL 3350 17 G/17G
17 POWDER, FOR SOLUTION ORAL DAILY PRN
Status: DISCONTINUED | OUTPATIENT
Start: 2022-11-17 | End: 2022-11-23 | Stop reason: HOSPADM

## 2022-11-17 RX ORDER — HALOPERIDOL 5 MG/1
5 TABLET ORAL
Status: DISCONTINUED | OUTPATIENT
Start: 2022-11-17 | End: 2022-11-23 | Stop reason: HOSPADM

## 2022-11-17 RX ORDER — HALOPERIDOL 1 MG/1
1 TABLET ORAL EVERY 6 HOURS PRN
Status: DISCONTINUED | OUTPATIENT
Start: 2022-11-17 | End: 2022-11-23 | Stop reason: HOSPADM

## 2022-11-17 RX ORDER — BISACODYL 10 MG
10 SUPPOSITORY, RECTAL RECTAL DAILY PRN
Status: DISCONTINUED | OUTPATIENT
Start: 2022-11-17 | End: 2022-11-23 | Stop reason: HOSPADM

## 2022-11-17 RX ORDER — ACETAMINOPHEN 325 MG/1
975 TABLET ORAL EVERY 6 HOURS PRN
Status: DISCONTINUED | OUTPATIENT
Start: 2022-11-17 | End: 2022-11-23 | Stop reason: HOSPADM

## 2022-11-17 RX ORDER — AMOXICILLIN 250 MG
1 CAPSULE ORAL DAILY PRN
Status: DISCONTINUED | OUTPATIENT
Start: 2022-11-17 | End: 2022-11-23 | Stop reason: HOSPADM

## 2022-11-17 RX ORDER — LORAZEPAM 2 MG/ML
2 INJECTION INTRAMUSCULAR EVERY 6 HOURS PRN
Status: DISCONTINUED | OUTPATIENT
Start: 2022-11-17 | End: 2022-11-23 | Stop reason: HOSPADM

## 2022-11-17 RX ADMIN — Medication 10 MG: at 21:12

## 2022-11-17 RX ADMIN — NICOTINE POLACRILEX 2 MG: 2 GUM, CHEWING ORAL at 17:53

## 2022-11-17 RX ADMIN — NICOTINE POLACRILEX 2 MG: 2 GUM, CHEWING ORAL at 15:44

## 2022-11-17 RX ADMIN — NICOTINE POLACRILEX 2 MG: 2 GUM, CHEWING ORAL at 13:41

## 2022-11-17 NOTE — ED PROVIDER NOTES
History  Chief Complaint   Patient presents with   • Psychiatric Evaluation     Pt brought in by [facility]  Boyfriend called that pt has been changing her mood too quickly back and forth and today was worse  Pt has no SI/HI/AH/VH Hx of cutting 10 yrs ago  Has not had medications or seen psychiatrist since then  HPI  Patient is a 44-year-old female past medical history of ADHD,  anxiety, PTSD presenting today with anxiety and frequent panic attacks  Patient reports that she has a l longstanding history of anxiety and depression  Reports that she has been having significant anxiety over the last several years with frequent panic attacks  Reports coming in tonight for the symptoms are really anxiety does report some worsening depression but denies any recent suicide ideation or attempts  She does report prior suicide ideation and a history of cutting over 10 years ago for which she was hospitalized  She reports she was initially on medicines for her anxiety and depression however has been off for several years and has not followed up with Psychiatry in several years  She reports significant anxiety but denies any physical symptoms including chest pain or shortness or breath  Prior to Admission Medications   Prescriptions Last Dose Informant Patient Reported? Taking?    clotrimazole (LOTRIMIN) 1 % cream   No No   Sig: Apply topically 2 (two) times a day   Patient not taking: Reported on 2021   clotrimazole-betamethasone (LOTRISONE) 1-0 05 % cream   No No   Sig: Apply topically 2 (two) times a day   Patient not taking: Reported on 2021   levonorgestrel (MIRENA) 20 MCG/24HR IUD   Yes No   Si each by Intrauterine route once   miconazole (MONISTAT-7) 2 % vaginal cream   No No   Sig: Insert 1 applicator into the vagina daily at bedtime   terconazole (TERAZOL 7) 0 4 % vaginal cream   No No   Sig: Insert 1 applicator into the vagina daily at bedtime   Patient not taking: Reported on 2021 triamcinolone (KENALOG) 0 1 % ointment   No No   Sig: Apply topically 2 (two) times a day      Facility-Administered Medications: None       Past Medical History:   Diagnosis Date   • ADHD    • Anemia     with pregnancy    • Anxiety    • Depression    • Gestational diabetes     2017   • History of migraine headaches    • PTSD (post-traumatic stress disorder)        Past Surgical History:   Procedure Laterality Date   • NO PAST SURGERIES         Family History   Problem Relation Age of Onset   • Anxiety disorder Mother    • No Known Problems Father    • Anxiety disorder Sister    • Anxiety disorder Brother    • Other Daughter         non-ketotic hyperglycemia      I have reviewed and agree with the history as documented  E-Cigarette/Vaping   • E-Cigarette Use Current Every Day User    • Start Date 1/1/19      E-Cigarette/Vaping Substances   • Nicotine Yes    • THC No    • CBD Yes    • Flavoring Yes    • Other No    • Unknown No      Social History     Tobacco Use   • Smoking status: Never   • Smokeless tobacco: Never   Vaping Use   • Vaping Use: Every day   • Start date: 1/1/2019   • Substances: Nicotine, CBD, Flavoring   Substance Use Topics   • Alcohol use: Not Currently   • Drug use: No       Review of Systems   Constitutional: Negative for chills and fever  HENT: Negative for congestion and sore throat  Eyes: Negative for redness and visual disturbance  Respiratory: Negative for cough and shortness of breath  Cardiovascular: Negative for chest pain and palpitations  Gastrointestinal: Negative for constipation, diarrhea, nausea and vomiting  Genitourinary: Negative for dysuria, hematuria, vaginal bleeding and vaginal discharge  Musculoskeletal: Negative for myalgias  Skin: Negative for rash and wound  Allergic/Immunologic: Negative for immunocompromised state  Neurological: Negative for seizures and syncope     Psychiatric/Behavioral: Positive for dysphoric mood, self-injury (Hx of) and suicidal ideas (Hx of)  Negative for confusion  The patient is nervous/anxious  Physical Exam  Physical Exam  Vitals and nursing note reviewed  Constitutional:       General: She is not in acute distress  Appearance: Normal appearance  She is well-developed  She is not ill-appearing  HENT:      Head: Normocephalic and atraumatic  No raccoon eyes  Right Ear: External ear normal       Left Ear: External ear normal       Nose: Nose normal  No congestion  Mouth/Throat:      Lips: Pink  Mouth: Mucous membranes are moist    Eyes:      General: Lids are normal  No scleral icterus  Conjunctiva/sclera: Conjunctivae normal    Cardiovascular:      Rate and Rhythm: Normal rate and regular rhythm  Heart sounds: No murmur heard  No friction rub  Pulmonary:      Effort: Pulmonary effort is normal  No respiratory distress  Breath sounds: No wheezing or rales  Abdominal:      General: Abdomen is flat  Tenderness: There is no abdominal tenderness  There is no guarding or rebound  Musculoskeletal:         General: No swelling or signs of injury  Cervical back: Normal range of motion  No rigidity or tenderness  Skin:     General: Skin is warm and dry  Coloration: Skin is not jaundiced  Findings: No rash  Neurological:      Mental Status: She is alert and oriented to person, place, and time  Mental status is at baseline  Psychiatric:         Mood and Affect: Mood is anxious  Behavior: Behavior is agitated  Behavior is cooperative           Vital Signs  ED Triage Vitals [11/16/22 1800]   Temperature Pulse Respirations Blood Pressure SpO2   100 1 °F (37 8 °C) (!) 155 (!) 24 164/95 98 %      Temp Source Heart Rate Source Patient Position - Orthostatic VS BP Location FiO2 (%)   Oral Monitor Sitting Right arm --      Pain Score       --           Vitals:    11/16/22 1800 11/16/22 1826   BP: 164/95    Pulse: (!) 155 (!) 119   Patient Position - Orthostatic VS: Sitting          Visual Acuity      ED Medications  Medications   LORazepam (ATIVAN) tablet 1 mg (1 mg Oral Given 11/16/22 1826)       Diagnostic Studies  Results Reviewed     Procedure Component Value Units Date/Time    Rapid drug screen, urine [[id number]]  (Abnormal) Collected: 11/16/22 1846    Lab Status: Final result Specimen: Urine, Clean Catch Updated: 11/16/22 1921     Amph/Meth UR Negative     Barbiturate Ur Negative     Benzodiazepine Urine Negative     Cocaine Urine Negative     Methadone Urine Negative     Opiate Urine Negative     PCP Ur Negative     THC Urine Negative     Oxycodone Urine Positive    Narrative:      Presumptive report  If requested, specimen will be sent to reference lab for confirmation  FOR MEDICAL PURPOSES ONLY  IF CONFIRMATION NEEDED PLEASE CONTACT THE LAB WITHIN 5 DAYS  Drug Screen Cutoff Levels:  AMPHETAMINE/METHAMPHETAMINES  1000 ng/mL  BARBITURATES     200 ng/mL  BENZODIAZEPINES     200 ng/mL  COCAINE      300 ng/mL  METHADONE      300 ng/mL  OPIATES      300 ng/mL  PHENCYCLIDINE     25 ng/mL  THC       50 ng/mL  OXYCODONE      100 ng/mL    POCT alcohol breath test [[id number]]  (Normal) Resulted: 11/16/22 1856    Lab Status: Final result Updated: 11/16/22 1856     EXTBreath Alcohol 0 000    POCT pregnancy, urine [[id number]]  (Normal) Resulted: 11/16/22 1852    Lab Status: Final result Specimen: Urine Updated: 11/16/22 1852     EXT Preg Test, Ur Negative     Control Valid                 No orders to display              Procedures  Procedures         ED Course   Patient medically cleared for further psychiatric evaluation  Will Discuss with crisis                                          MDM  Patient on arrival is ambulatory to room is in no acute distress, vital signs stable, afebrile  On exam lungs clear auscultation, heart without murmurs rubs or gallops abdomen soft nontender   Tachycardia likely related to anxiety, EKG sinus tachycardia wo ST deviation or t wave inversions, No significant QRS widening or QT prolongation  Disposition  Final diagnoses:   None     ED Disposition     ED Disposition   Transfer to Behavioral Health Condition   --    Date/Time   Wed Nov 16, 2022  7:29 PM    Comment   Maricel Barriga should be transferred out to D and has been medically cleared  Follow-up Information    None         Patient's Medications   Discharge Prescriptions    No medications on file       No discharge procedures on file      PDMP Review     None          ED Provider  Electronically Signed by           Lucia Vogt MD  11/16/22 9118

## 2022-11-17 NOTE — CONSULTS
[address] [place] [address] 1990, 33 y o  female MRN: [id number]  Unit/Bed#: Sp [facility] 009-97 Encounter: [id number]  Primary Care Provider: No primary care provider on file  Date and time admitted to hospital: 11/16/2022  6:00 PM    Inpatient consult for Medical Clearance for [facility] patient  Consult performed by: Jabier Monaco PA-C  Consult ordered by: Andra Frank PA-C          Medical clearance for psychiatric admission  Assessment & Plan  Patient is medically cleared for admission to the [facility] for treatment of the underlying psychiatric illness    Vapes nicotine containing substance  Assessment & Plan  · Encouraged cessation  · Offer NRT    Obesity (BMI 30 0-34  9)  Assessment & Plan  · BMI 34 08  · Encouraged lifestyle modifications    ECT Clearance:  • History of recent seizure or stroke:  no  • History of pheochromocytoma:  no  • History of active bleeding (Intracranial hemorrhage, aneurysm or AVM):  no  • History of metallic implants in the head or neck:  no  • History of increased intracranial pressure with mass effect:  no  ·   EKG within 3 months? Yes - 11/16/22  o If yes, was an arrhythmia present and at baseline?no arrhythmia  o If yes, what is the baseline QT/QTc interval?  o If no, obtain prior to ECT for arrhythmia evaluation and baseline QT interval     Based on above criteria, Patient is medically cleared for ECT should it be recommended  Counseling / Coordination of Care Time: 30 minutes  Greater than 50% of total time spent on patient counseling and coordination of care  Collaboration of Care: Were Recommendations Directly Discussed with Primary Treatment Team? - No     History of Present Illness:    Autry Gottron is a 33 y o  female who is originally admitted to the psychiatry service due to mood lability  We are consulted for medical clearance for admission to [address] [place] [address] and treatment of underlying psychiatric illness  This patient has no significant past medical history  Per chart review, she initially presented to the ED for mood lability  On evaluation patient denies any physical complaints such as headache, dizziness, chest pain, shortness of breath, nausea/vomiting/diarrhea at this time  Review of Systems:    Review of Systems   Constitutional: Negative for activity change, chills, diaphoresis and fever  HENT: Negative for congestion, rhinorrhea, sinus pressure, sinus pain and sore throat  Eyes: Negative for visual disturbance  Respiratory: Negative for cough, shortness of breath and wheezing  Cardiovascular: Negative for chest pain and palpitations  Gastrointestinal: Negative for abdominal distention, abdominal pain, constipation, diarrhea, nausea and vomiting  Genitourinary: Negative for dysuria, frequency, hematuria and urgency  Musculoskeletal: Negative for arthralgias, back pain and myalgias  Skin: Negative for rash  Neurological: Negative for dizziness, weakness, light-headedness and headaches  Past Medical and Surgical History:     Past Medical History:   Diagnosis Date   • ADHD    • Anemia     with pregnancy    • Anxiety    • Depression    • Gestational diabetes     2017   • History of migraine headaches    • PTSD (post-traumatic stress disorder)        Past Surgical History:   Procedure Laterality Date   • NO PAST SURGERIES         Meds/Allergies:    all medications and allergies reviewed    Allergies:    Allergies   Allergen Reactions   • Macrobid [Nitrofurantoin] Swelling   • Ibuprofen Itching       Social History:     Marital Status: Single    Substance Use History:   Social History     Substance and Sexual Activity   Alcohol Use Not Currently     Social History     Tobacco Use   Smoking Status Never   Smokeless Tobacco Never     Social History     Substance and Sexual Activity   Drug Use No       Family History:    non-contributory    Physical Exam:     Vitals:   Blood Pressure: 123/64 (11/17/22 1208)  Pulse: 95 (11/17/22 1208)  Temperature: 97 9 °F (36 6 °C) (11/17/22 1208)  Temp Source: Temporal (11/17/22 1208)  Respirations: 16 (11/17/22 1208)  Height: 5' (152 4 cm) (11/17/22 1208)  Weight - Scale: 79 2 kg (174 lb 8 oz) (11/17/22 1208)  SpO2: 96 % (11/17/22 1208)    Physical Exam  Constitutional:       General: She is not in acute distress  Appearance: Normal appearance  She is obese  She is not ill-appearing, toxic-appearing or diaphoretic  HENT:      Head: Normocephalic and atraumatic  Nose: Nose normal  No congestion or rhinorrhea  Mouth/Throat:      Mouth: Mucous membranes are moist    Eyes:      General: No scleral icterus  Extraocular Movements: Extraocular movements intact  Cardiovascular:      Rate and Rhythm: Normal rate and regular rhythm  Heart sounds: Normal heart sounds  No murmur heard  Pulmonary:      Effort: Pulmonary effort is normal  No respiratory distress  Breath sounds: Normal breath sounds  No wheezing  Abdominal:      General: Abdomen is flat  Bowel sounds are normal  There is no distension  Palpations: Abdomen is soft  Tenderness: There is no abdominal tenderness  Musculoskeletal:      Right lower leg: No edema  Left lower leg: No edema  Skin:     General: Skin is warm and dry  Coloration: Skin is not jaundiced  Neurological:      General: No focal deficit present  Mental Status: She is alert and oriented to person, place, and time  Additional Data:     Lab Results: I have personally reviewed pertinent reports  No results found for: HGBA1C        EKG, Pathology, and Other Studies Reviewed on Admission:   · EKG on 11/16/22 shows sinus tachycardia        ** Please Note: This note has been constructed using a voice recognition system   **

## 2022-11-17 NOTE — ED NOTES
Pt presented to the ED as a self-referral due to worsening panic attacks and more intense mood swings  Pt reports that over the past 2-4 weeks she has had more frequent panic attacks that have been preventing her from leaving her house  This has caused more arguements between her boyfriend and herself  Pt reports the stressor was an arguement yesterday where her boyfriend and his grandma were saying negative things about her (laziness, doesn't do anything around the house)  Pt reports 3-4 prior hospitalizations, most recent admit was 10 years ago  This was also the last time she had outpatient treatment and medication management  her sleep has been sporatic and the melatonin is no longer effective  She has had decreased appetite without noticable weight loss  Pt does have a history of cutting both as self-harm and suicide attempt 10 years ago  Pt denies HI/AH/VH  Pt does report feeling paranoid when in a large group of people  Pt does work full-time and 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

## 2022-11-17 NOTE — ASSESSMENT & PLAN NOTE
Patient is medically cleared for admission to the [facility] for treatment of the underlying psychiatric illness

## 2022-11-17 NOTE — PLAN OF CARE
Problem: Risk for Self Injury/Neglect  Goal: Treatment Goal: Remain safe during length of stay, learn and adopt new coping skills, and be free of self-injurious ideation, impulses and acts at the time of discharge  Outcome: Progressing     Problem: Nutrition/Hydration-ADULT  Goal: Nutrient/Hydration intake appropriate for improving, restoring or maintaining nutritional needs  Description: Monitor and assess patient's nutrition/hydration status for malnutrition  Collaborate with interdisciplinary team and initiate plan and interventions as ordered  Monitor patient's weight and dietary intake as ordered or per policy  Utilize nutrition screening tool and intervene as necessary  Determine patient's food preferences and provide high-protein, high-caloric foods as appropriate       INTERVENTIONS:  - Monitor oral intake, urinary output, labs, and treatment plans  - Assess nutrition and hydration status and recommend course of action  - Evaluate amount of meals eaten  - Assist patient with eating if necessary   - Allow adequate time for meals  - Recommend/ encourage appropriate diets, oral nutritional supplements, and vitamin/mineral supplements  - Order, calculate, and assess calorie counts as needed  - Recommend, monitor, and adjust tube feedings and TPN/PPN based on assessed needs  - Assess need for intravenous fluids  - Provide specific nutrition/hydration education as appropriate  - Include patient/family/caregiver in decisions related to nutrition  Outcome: Not Progressing     Problem: Ineffective Coping  Goal: Identifies ineffective coping skills  Outcome: Not Progressing  Goal: Demonstrates healthy coping skills  Outcome: Not Progressing     Problem: Depression  Goal: Treatment Goal: Demonstrate behavioral control of depressive symptoms, verbalize feelings of improved mood/affect, and adopt new coping skills prior to discharge  Outcome: Not Progressing     Problem: Anxiety  Goal: Anxiety is at manageable level  Description: Interventions:  - Assess and monitor patient's anxiety level  - Monitor for signs and symptoms (heart palpitations, chest pain, shortness of breath, headaches, nausea, feeling jumpy, restlessness, irritable, apprehensive)  - Collaborate with interdisciplinary team and initiate plan and interventions as ordered    - [place] patient to unit/surroundings  - Explain treatment plan  - Encourage participation in care  - Encourage verbalization of concerns/fears  - Identify coping mechanisms  - Assist in developing anxiety-reducing skills  - Administer/offer alternative therapies  - Limit or eliminate stimulants  Outcome: Not Progressing     Problem: Ineffective Coping  Goal: Cooperates with admission process  Description: Interventions:   - Complete admission process  Outcome: Completed

## 2022-11-17 NOTE — ED NOTES
Patient is accepted at [facility]  Patient is accepted by Shruthi Ring PA-C, for El Live MD      Nurse report is to be called at [phone] to  prior to patient transfer

## 2022-11-17 NOTE — ED NOTES
Pt is uninsured at this time  Pt is requesting to consider in-network options tomorrow morning before bed searching to other facilities that accept MA soha beds  201 sent to Intake for consideration when a bed becomes available

## 2022-11-17 NOTE — NURSING NOTE
Pt isolating to room and self, ate dinner in her room  Pt rating anxiety and depression "throught the roof" but at this time declined PRN's  Pt denies SI,HI,AH and VH  Pt has no current complaints or concerns  Pt is calm and cooperative

## 2022-11-18 PROBLEM — F81.9 LEARNING DISABILITY: Chronic | Status: ACTIVE | Noted: 2022-11-18

## 2022-11-18 PROBLEM — F41.0 PANIC DISORDER WITHOUT AGORAPHOBIA: Chronic | Status: ACTIVE | Noted: 2022-11-18

## 2022-11-18 PROBLEM — F33.2 MAJOR DEPRESSIVE DISORDER, RECURRENT, SEVERE WITHOUT PSYCHOTIC FEATURES (HCC): Chronic | Status: ACTIVE | Noted: 2022-11-18

## 2022-11-18 PROBLEM — F43.12 POST-TRAUMATIC STRESS DISORDER, CHRONIC: Chronic | Status: ACTIVE | Noted: 2022-11-18

## 2022-11-18 PROBLEM — F33.3 MAJOR DEPRESSIVE DISORDER, RECURRENT, SEVERE WITH PSYCHOTIC FEATURES (HCC): Chronic | Status: ACTIVE | Noted: 2022-11-18

## 2022-11-18 PROBLEM — F40.01 PANIC DISORDER WITH AGORAPHOBIA: Chronic | Status: ACTIVE | Noted: 2022-11-18

## 2022-11-18 PROBLEM — Z87.59 HISTORY OF POSTPARTUM HEMORRHAGE: Status: ACTIVE | Noted: 2017-10-13

## 2022-11-18 PROBLEM — F41.1 GAD (GENERALIZED ANXIETY DISORDER): Chronic | Status: ACTIVE | Noted: 2022-11-18

## 2022-11-18 LAB
25(OH)D3 SERPL-MCNC: 11.8 NG/ML (ref 30–100)
ALBUMIN SERPL BCP-MCNC: 3.9 G/DL (ref 3.5–5)
ALP SERPL-CCNC: 51 U/L (ref 43–122)
ALT SERPL W P-5'-P-CCNC: 94 U/L
ANION GAP SERPL CALCULATED.3IONS-SCNC: 6 MMOL/L (ref 5–14)
AST SERPL W P-5'-P-CCNC: 50 U/L (ref 14–36)
BASOPHILS # BLD AUTO: 0.04 THOUSANDS/ÂΜL (ref 0–0.1)
BASOPHILS NFR BLD AUTO: 0 % (ref 0–1)
BILIRUB SERPL-MCNC: 0.55 MG/DL (ref 0.2–1)
BUN SERPL-MCNC: 9 MG/DL (ref 5–25)
CALCIUM SERPL-MCNC: 9.3 MG/DL (ref 8.4–10.2)
CHLORIDE SERPL-SCNC: 103 MMOL/L (ref 96–108)
CHOLEST SERPL-MCNC: 193 MG/DL
CO2 SERPL-SCNC: 29 MMOL/L (ref 21–32)
CREAT SERPL-MCNC: 0.77 MG/DL (ref 0.6–1.2)
EOSINOPHIL # BLD AUTO: 0.17 THOUSAND/ÂΜL (ref 0–0.61)
EOSINOPHIL NFR BLD AUTO: 2 % (ref 0–6)
ERYTHROCYTE [DISTWIDTH] IN BLOOD BY AUTOMATED COUNT: 12.3 % (ref 11.6–15.1)
EST. AVERAGE GLUCOSE BLD GHB EST-MCNC: 108 MG/DL
FOLATE SERPL-MCNC: 8.6 NG/ML (ref 3.1–17.5)
GFR SERPL CREATININE-BSD FRML MDRD: 103 ML/MIN/1.73SQ M
GLUCOSE P FAST SERPL-MCNC: 105 MG/DL (ref 70–99)
GLUCOSE SERPL-MCNC: 105 MG/DL (ref 70–99)
HBA1C MFR BLD: 5.4 %
HCG SERPL QL: NEGATIVE
HCT VFR BLD AUTO: 40.8 % (ref 34.8–46.1)
HDLC SERPL-MCNC: 40 MG/DL
HGB BLD-MCNC: 13.6 G/DL (ref 11.5–15.4)
IMM GRANULOCYTES # BLD AUTO: 0.03 THOUSAND/UL (ref 0–0.2)
IMM GRANULOCYTES NFR BLD AUTO: 0 % (ref 0–2)
LDLC SERPL CALC-MCNC: 129 MG/DL
LYMPHOCYTES # BLD AUTO: 2.35 THOUSANDS/ÂΜL (ref 0.6–4.47)
LYMPHOCYTES NFR BLD AUTO: 26 % (ref 14–44)
MCH RBC QN AUTO: 29.1 PG (ref 26.8–34.3)
MCHC RBC AUTO-ENTMCNC: 33.3 G/DL (ref 31.4–37.4)
MCV RBC AUTO: 87 FL (ref 82–98)
MONOCYTES # BLD AUTO: 0.52 THOUSAND/ÂΜL (ref 0.17–1.22)
MONOCYTES NFR BLD AUTO: 6 % (ref 4–12)
NEUTROPHILS # BLD AUTO: 5.79 THOUSANDS/ÂΜL (ref 1.85–7.62)
NEUTS SEG NFR BLD AUTO: 66 % (ref 43–75)
NONHDLC SERPL-MCNC: 153 MG/DL
NRBC BLD AUTO-RTO: 0 /100 WBCS
PLATELET # BLD AUTO: 295 THOUSANDS/UL (ref 149–390)
PMV BLD AUTO: 10.8 FL (ref 8.9–12.7)
POTASSIUM SERPL-SCNC: 3.8 MMOL/L (ref 3.5–5.3)
PROT SERPL-MCNC: 7.1 G/DL (ref 6.4–8.4)
RBC # BLD AUTO: 4.67 MILLION/UL (ref 3.81–5.12)
SODIUM SERPL-SCNC: 138 MMOL/L (ref 135–147)
TRIGL SERPL-MCNC: 121 MG/DL
TSH SERPL DL<=0.05 MIU/L-ACNC: 1.13 UIU/ML (ref 0.45–4.5)
VIT B12 SERPL-MCNC: 480 PG/ML (ref 100–900)
WBC # BLD AUTO: 8.9 THOUSAND/UL (ref 4.31–10.16)

## 2022-11-18 RX ORDER — QUETIAPINE FUMARATE 50 MG/1
50 TABLET, FILM COATED ORAL DAILY
Status: DISCONTINUED | OUTPATIENT
Start: 2022-11-18 | End: 2022-11-19

## 2022-11-18 RX ORDER — FLUOXETINE HYDROCHLORIDE 20 MG/1
20 CAPSULE ORAL DAILY
Status: DISCONTINUED | OUTPATIENT
Start: 2022-11-19 | End: 2022-11-23 | Stop reason: HOSPADM

## 2022-11-18 RX ORDER — FLUOXETINE 10 MG/1
10 CAPSULE ORAL ONCE
Status: COMPLETED | OUTPATIENT
Start: 2022-11-18 | End: 2022-11-18

## 2022-11-18 RX ADMIN — FLUOXETINE 10 MG: 10 CAPSULE ORAL at 12:17

## 2022-11-18 RX ADMIN — NICOTINE POLACRILEX 2 MG: 2 GUM, CHEWING ORAL at 13:33

## 2022-11-18 RX ADMIN — NICOTINE POLACRILEX 2 MG: 2 GUM, CHEWING ORAL at 20:23

## 2022-11-18 RX ADMIN — QUETIAPINE FUMARATE 50 MG: 50 TABLET ORAL at 20:49

## 2022-11-18 RX ADMIN — NICOTINE POLACRILEX 2 MG: 2 GUM, CHEWING ORAL at 17:41

## 2022-11-18 RX ADMIN — PROPRANOLOL HYDROCHLORIDE 10 MG: 10 TABLET ORAL at 22:49

## 2022-11-18 RX ADMIN — HYDROXYZINE HYDROCHLORIDE 50 MG: 50 TABLET, FILM COATED ORAL at 12:17

## 2022-11-18 RX ADMIN — NICOTINE POLACRILEX 2 MG: 2 GUM, CHEWING ORAL at 11:07

## 2022-11-18 NOTE — TREATMENT PLAN
TREATMENT PLAN REVIEW - [address] 32 y o  1990 female MRN: [id number]    [address] Room / Bed: Sp [facility] 461-54 Encounter: [id number]        Admit Date/Time:  11/16/2022  6:00 PM    Treatment Team: Attending Provider: Veleria Kussmaul, MD; Consulting Physician: Jabier Monaco PA-C; Care Manager: Julián Joyce; Patient Care Assistant: Robbie Greenberg; Nursing Student: Edna Quesada; Registered Nurse: Delroy Fenton RN; : Shilpa Doe; Patient Care Assistant: Aurora Hein    Diagnosis: Principal Problem:    Major depressive disorder, recurrent, severe with psychotic features ([facility] )  Active Problems:    JEF (generalized anxiety disorder)    Post-traumatic stress disorder, chronic    Panic disorder with agoraphobia    Learning disability    Medical clearance for psychiatric admission    Obesity (BMI 30 0-34  9)    Vapes nicotine containing substance      Patient Strengths/Assets: motivation for treatment/growth, negotiates basic needs, patient is on a voluntary commitment, stable housing, supportive boyfriend      Patient Barriers/Limitations: chronic mental illness, difficulty adapting, noncompliant with medication, noncompliant with treatment    Short Term Goals: decrease in depressive symptoms, decrease in anxiety symptoms, tolerate medications    Long Term Goals: improvement in anxiety, resolution of depressive symptoms, free of suicidal thoughts, adequate self care, adequate sleep, adequate appetite    Progress Towards Goals: restarting psychiatric medications as prescribed    Recommended Treatment: medication management, patient medication education, group therapy, milieu therapy, continued Behavioral Health psychiatric evaluation/assessment process     Treatment Frequency: daily medication monitoring, group and milieu therapy daily, monitoring through interdisciplinary rounds, monitoring through weekly patient care conferences    Expected Discharge Date: 6 days - 11/24/2022    Discharge Plan: referral for outpatient medication management with a psychiatrist, referral for outpatient psychotherapy, return to previous living arrangement    Treatment Plan Created/Updated By: Beatriz Madison MD

## 2022-11-18 NOTE — PROGRESS NOTES
Progress Note - [address] 33 y o  female MRN: [id number]   Unit/Bed#: [facility] 377-02 Encounter: [id number]    Behavior over the last 24 hours: unchanged  Enrrique Brannon still feels anxious and depressed, rates mood as 6 on a scale of 1 (best mood) to 10 (worst mood) today  She also still feels helpless and hopeless today - denies current suicidal thoughts, but would not feel safe if discharged  She states that she had "jittery feeling" yesterday after taking bedtime Seroquel and asked for Atarax to help her with those symptoms and anxiety  She does not want to attend groups due to paranoid feelings "I am scared around others  If there is a loud noise, I will have a panic attack"  Compliant with medications      Sleep: decreased  Appetite: improving  Medication side effects: Yes - "jittery feeling"   ROS: reports "jittery feeling", denies any shortness of breath or chest pain, all other systems are negative    Mental Status Evaluation:    Appearance:  casually dressed   Behavior:  cooperative, limited eye contact   Speech:  normal rate, soft   Mood:  depressed, anxious   Affect:  blunted   Thought Process:  organized, concrete   Associations: concrete associations   Thought Content:  some paranoia   Perceptual Disturbances: no auditory hallucinations, no visual hallucinations   Risk Potential: Suicidal ideation - None at present, but would not feel safe if discharged today  Homicidal ideation - None  Potential for aggression - No   Sensorium:  oriented to person, place and time/date   Memory:  recent and remote memory grossly intact   Consciousness:  alert and awake   Attention/Concentration: decreased concentration and decreased attention span   Insight:  impaired   Judgment: impaired   Gait/Station: normal gait/station, normal balance   Motor Activity: no abnormal movements     Vital signs in last 24 hours:    Temp:  [98 1 °F (36 7 °C)-98 3 °F (36 8 °C)] 98 1 °F (36 7 °C)  HR:  [] 87  Resp:  [16-18] 16  BP: (101-133)/(64-79) 101/64    Laboratory results: I have personally reviewed all pertinent laboratory/tests results    Hemoglobin A1C/EST AVG Glucose   Lab Results   Component Value Date    HGBA1C 5 4 11/18/2022     11/18/2022   Vitamin B12   Lab Results   Component Value Date    JDYIOSZB88 480 11/18/2022   Folate   Lab Results   Component Value Date    FOLATE 8 6 11/18/2022       Suicide/Homicide Risk Assessment:    Risk of Harm to Self:   Nursing Suicide Risk Assessment Last 24 hours: C-SSRS Risk (Since Last Contact)  Calculated C-SSRS Risk Score (Since Last Contact): No Risk Indicated  Current Specific Risk Factors include: diagnosis of depression, current depressive symptoms, current anxiety symptoms, presence of paranoid ideation, hopelessness  Protective Factors: no current suicidal ideation, ability to communicate with staff on the unit, taking medications as ordered on the unit  Based on today's assessment, Yasmin Carrizales presents the following risk of harm to self: low    Risk of Harm to Others:  Nursing Homicide Risk Assessment: Violence Risk to Others: Denies within past 6 months  Based on today's assessment, Yasmin Carrizales presents the following risk of harm to others: none    The following interventions are recommended: behavioral checks every 7 minutes, continued hospitalization on locked unit    Progress Toward Goals: no significant progress, still anxious, continues to feel depressed, still paranoid, denies current suicidal thoughts    Assessment/Plan   Principal Problem:    Major depressive disorder, recurrent, severe with psychotic features ([facility] )  Active Problems:    JEF (generalized anxiety disorder)    Post-traumatic stress disorder, chronic    Panic disorder with agoraphobia    Learning disability    Medical clearance for psychiatric admission    Obesity (BMI 30 0-34  9)    Vapes nicotine containing substance      Recommended Treatment:     Planned medication and treatment changes: All current active medications have been reviewed  Encourage group therapy, milieu therapy and occupational therapy  Behavioral Health checks every 7 minutes   Increase Prozac to 20 mg daily to help with depressive symptoms  Change Seroquel to 50 mg at 6 PM to help with paranoid thoughts and anxiety  She would like to try to take Seroquel earlier due to "jittery feeling"   If those symptoms persist - will consider changing Seroquel to another antipsychotic medication  Change and decrease Melatonin to 3 mg at bedtime as needed (she is concerned that Melatonin may be causing "jittery feeling" as well)  Add Trazodone 50 mg at bedtime to help with sleep    Continue all other medications:    Current Facility-Administered Medications   Medication Dose Route Frequency Provider Last Rate   • acetaminophen  650 mg Oral Q6H PRN Christopher Pop PA-C     • acetaminophen  650 mg Oral Q4H PRN Christopher Pop PA-C     • acetaminophen  975 mg Oral Q6H PRN Tim Boyer PA-C     • aluminum-magnesium hydroxide-simethicone  30 mL Oral Q4H PRN Christopher Pop PA-C     • haloperidol lactate  2 5 mg Intramuscular Q4H PRN Max 4/day Tim Walker PA-C      And   • LORazepam  1 mg Intramuscular Q4H PRN Max 4/day Tim Walker PA-C      And   • benztropine  0 5 mg Intramuscular Q4H PRN Max 4/day Tim Walker PA-C     • haloperidol lactate  5 mg Intramuscular Q4H PRN Max 4/day Tim Walker PA-C      And   • LORazepam  2 mg Intramuscular Q4H PRN Max 4/day Christopher Pop PA-C      And   • benztropine  1 mg Intramuscular Q4H PRN Max 4/day Tim Walker PA-C     • benztropine  1 mg Intramuscular Q4H PRN Max 6/day Christopher Pop PA-C     • benztropine  1 mg Oral Q4H PRN Max 6/day Tim Walker PA-C     • bisacodyl  10 mg Rectal Daily PRN Christopher Pop PA-C     • cholecalciferol  2,000 Units Oral Daily Nato Dick MD     • hydrOXYzine HCL  50 mg Oral Q6H PRN Max 4/day [facility] ARIANNA Walker      Or   • diphenhydrAMINE  50 mg Intramuscular Q6H PRN Kota Magaña PA-C     • FLUoxetine  20 mg Oral Daily Inder Ayala MD     • glycerin-hypromellose-  1 drop Both Eyes Q3H PRN Kota Magaña PA-C     • haloperidol  1 mg Oral Q6H PRN Kota Magaña PA-C     • haloperidol  2 5 mg Oral Q4H PRN Max 4/[facility]     • haloperidol  5 mg Oral Q4H PRN Max 4/day [facility]     • hydrOXYzine HCL  100 mg Oral Q6H PRN Max 4/day [facility]      Or   • LORazepam  2 mg Intramuscular Q6H PRN Kota Maagña PA-C     • hydrOXYzine HCL  25 mg Oral Q6H PRN Max 4/day [facility] ARIANNA Walker     • melatonin  3 mg Oral HS PRN Inder Ayala MD     • nicotine polacrilex  2 mg Oral Q2H PRN Ena Andrade PA-C     • polyethylene glycol  17 g Oral Daily PRN Kota Magaña PA-C     • propranolol  10 mg Oral Q8H PRN Kota Magaña PA-C     • QUEtiapine  50 mg Oral QPM Inder Ayala MD     • senna-docusate sodium  1 tablet Oral Daily PRN Kota Magaña PA-C     • traZODone  50 mg Oral HS Inder Ayala MD       Risks / Benefits of Treatment:    Risks, benefits, and possible side effects of medications explained to patient and patient verbalizes understanding and agreement for treatment  Risks of medications in pregnancy explained if female patient  Patient verbalizes understanding and agrees to notify her doctor if she becomes pregnant  Counseling / Coordination of Care:    Patient's progress discussed with staff in treatment team meeting  Medications, treatment progress and treatment plan reviewed with patient  Medication changes discussed with patient      Angelia Morgan MD 11/19/22

## 2022-11-18 NOTE — PROGRESS NOTES
11/18/22 0866   Team Meeting   Meeting Type Daily Rounds   Team Members Present   Team Members Present Physician;Nurse;; Other (Discipline and Name)   Physician Team Member Elizabeth Zacarias, [address] [facility] Team Member Castillo   Social Work Team Member Mikael   Other (Discipline and Name) Elizabeth CAMARILLO   Patient/Family Present   Patient Present No   Patient's Family Present No     Patient is a new admission for increased anxiety, paranoid thoughts  History of SIB  201

## 2022-11-18 NOTE — NURSING NOTE
Upon assessment patient reports moderate anxiety and moderate depression symptoms  Patient denies SI, HI, AH and VH  Patient is cooperative with staff  Patient is medication and meal compliant  Patient states she stays in her room because of increasing anxiety  Patient requested PRN atarax 50 mg po for moderate anxiety  PRN atarax 50 mg was administered at 1217  Patient is visible on the unit to use the telephone and address her needs  Patient denies any other needs at this time

## 2022-11-18 NOTE — NURSING NOTE
Pt isolative to her room during the evening  Denies any unmeet needs  Compliant with schedule medication   Patient remain Q 7 min check

## 2022-11-18 NOTE — PROGRESS NOTES
11/17/22 1400   Activity/Group Checklist   Group Other (Comment)  (Group Art Therapy/Psychodynamic, Open Choice Focusing on Thoughts vs  Statements with Discussion)   Attendance Attended   Attendance Duration (min) Greater than 60   Interactions Interacted appropriately  (Required prompting)   Affect/Mood Appropriate   Goals Achieved Able to listen to others; Able to recieve feedback  (Able to engage materials, but avoided directive; full participation with discussion)
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
## 2022-11-18 NOTE — PLAN OF CARE
Problem: Ineffective Coping  Goal: Identifies ineffective coping skills  Outcome: Progressing  Goal: Demonstrates healthy coping skills  Outcome: Progressing  Goal: Participates in unit activities  Description: Interventions:  - Provide therapeutic environment   - Provide required programming   - Redirect inappropriate behaviors   Outcome: Progressing     Problem: Risk for Self Injury/Neglect  Goal: Treatment Goal: Remain safe during length of stay, learn and adopt new coping skills, and be free of self-injurious ideation, impulses and acts at the time of discharge  Outcome: Progressing     Problem: Depression  Goal: Treatment Goal: Demonstrate behavioral control of depressive symptoms, verbalize feelings of improved mood/affect, and adopt new coping skills prior to discharge  Outcome: Progressing     Problem: DISCHARGE PLANNING  Goal: Discharge to home or other facility with appropriate resources  Description: INTERVENTIONS:  - Identify barriers to discharge w/patient and caregiver  - Arrange for needed discharge resources and transportation as appropriate  - Identify discharge learning needs (meds, wound care, etc )  - Refer to Case Management Department for coordinating discharge planning if the patient needs post-hospital services based on physician/advanced practitioner order or complex needs related to functional status, cognitive ability, or social support system  Outcome: Progressing     Problem: Anxiety  Goal: Anxiety is at manageable level  Description: Interventions:  - Assess and monitor patient's anxiety level  - Monitor for signs and symptoms (heart palpitations, chest pain, shortness of breath, headaches, nausea, feeling jumpy, restlessness, irritable, apprehensive)  - Collaborate with interdisciplinary team and initiate plan and interventions as ordered    - [place] patient to unit/surroundings  - Explain treatment plan  - Encourage participation in care  - Encourage verbalization of concerns/fears  - Identify coping mechanisms  - Assist in developing anxiety-reducing skills  - Administer/offer alternative therapies  - Limit or eliminate stimulants  Outcome: Not Progressing

## 2022-11-19 RX ORDER — LANOLIN ALCOHOL/MO/W.PET/CERES
3 CREAM (GRAM) TOPICAL
Status: DISCONTINUED | OUTPATIENT
Start: 2022-11-19 | End: 2022-11-23 | Stop reason: HOSPADM

## 2022-11-19 RX ORDER — MELATONIN
2000 DAILY
Status: DISCONTINUED | OUTPATIENT
Start: 2022-11-19 | End: 2022-11-23 | Stop reason: HOSPADM

## 2022-11-19 RX ORDER — TRAZODONE HYDROCHLORIDE 50 MG/1
50 TABLET ORAL
Status: DISCONTINUED | OUTPATIENT
Start: 2022-11-19 | End: 2022-11-23 | Stop reason: HOSPADM

## 2022-11-19 RX ORDER — QUETIAPINE FUMARATE 50 MG/1
50 TABLET, FILM COATED ORAL EVERY EVENING
Status: DISCONTINUED | OUTPATIENT
Start: 2022-11-19 | End: 2022-11-20

## 2022-11-19 RX ADMIN — QUETIAPINE FUMARATE 50 MG: 50 TABLET ORAL at 17:50

## 2022-11-19 RX ADMIN — NICOTINE POLACRILEX 2 MG: 2 GUM, CHEWING ORAL at 08:30

## 2022-11-19 RX ADMIN — NICOTINE POLACRILEX 2 MG: 2 GUM, CHEWING ORAL at 17:50

## 2022-11-19 RX ADMIN — NICOTINE POLACRILEX 2 MG: 2 GUM, CHEWING ORAL at 19:35

## 2022-11-19 RX ADMIN — CHOLECALCIFEROL TAB 25 MCG (1000 UNIT) 2000 UNITS: 25 TAB at 09:55

## 2022-11-19 RX ADMIN — NICOTINE POLACRILEX 2 MG: 2 GUM, CHEWING ORAL at 11:55

## 2022-11-19 RX ADMIN — NICOTINE POLACRILEX 2 MG: 2 GUM, CHEWING ORAL at 15:07

## 2022-11-19 RX ADMIN — FLUOXETINE 20 MG: 20 CAPSULE ORAL at 08:30

## 2022-11-19 RX ADMIN — NICOTINE POLACRILEX 2 MG: 2 GUM, CHEWING ORAL at 21:35

## 2022-11-19 RX ADMIN — TRAZODONE HYDROCHLORIDE 50 MG: 50 TABLET ORAL at 21:13

## 2022-11-19 NOTE — NURSING NOTE
Patient has been awake, alert, and visible intermittently out in the milieu  Patient compliant with her schedule Seroquel  Patient refused her melatonin   Denies any unmeet needs  Patient remain Q 7 in check

## 2022-11-19 NOTE — PROGRESS NOTES
Progress Note - [address] 33 y o  female MRN: [id number]   Unit/Bed#: Alanis Nielsen 377-02 Encounter: [id number]    Behavior over the last 24 hours: slowly improving  Doristine Clutter states she feels slightly less depressed today  She still has blunted affect and seems anxious  She denies suicidal thoughts, but still would not feel safe outside the hospital setting  Compliant with medications  Attends groups more often  She feels less paranoid and socializes more with peers "I am feeling better around other people"      Sleep: slept better  Appetite: improving  Medication side effects: No - "jittery feeling" has resolved   ROS: no complaints, "jittery feeling" is resolved, denies any shortness of breath or chest pain, all other systems are negative    Mental Status Evaluation:    Appearance:  casually dressed   Behavior:  cooperative, limited eye contact   Speech:  normal rate, soft   Mood:  anxious, slightly less depressed   Affect:  blunted   Thought Process:  organized, concrete   Associations: concrete associations   Thought Content:  less paranoid   Perceptual Disturbances: no auditory hallucinations, no visual hallucinations   Risk Potential: Suicidal ideation - None at present  Homicidal ideation - None  Potential for aggression - No   Sensorium:  oriented to person, place and time/date   Memory:  recent and remote memory grossly intact   Consciousness:  alert and awake   Attention/Concentration: decreased concentration and decreased attention span   Insight:  impaired   Judgment: impaired   Gait/Station: normal gait/station, normal balance   Motor Activity: no abnormal movements     Vital signs in last 24 hours:    Temp:  [98 5 °F (36 9 °C)-98 6 °F (37 °C)] 98 5 °F (36 9 °C)  HR:  [] 72  Resp:  [16] 16  BP: (124-139)/(63-77) 124/63    Laboratory results: I have personally reviewed all pertinent laboratory/tests results    Results from the past 24 hours: No results found for this or any previous visit (from the past 24 hour(s))  Suicide/Homicide Risk Assessment:    Risk of Harm to Self:   Nursing Suicide Risk Assessment Last 24 hours: C-SSRS Risk (Since Last Contact)  Calculated C-SSRS Risk Score (Since Last Contact): No Risk Indicated  Current Specific Risk Factors include: diagnosis of depression, current depressive symptoms, current anxiety symptoms, presence of paranoid ideation  Protective Factors: no current suicidal ideation, ability to communicate with staff on the unit, taking medications as ordered on the unit  Based on today's assessment, Daiva Decent presents the following risk of harm to self: low    Risk of Harm to Others:  Nursing Homicide Risk Assessment: Violence Risk to Others: Denies within past 6 months  Based on today's assessment, Daiva Decent presents the following risk of harm to others: none    The following interventions are recommended: behavioral checks every 7 minutes, continued hospitalization on locked unit    Progress Toward Goals: progressing slowly, still anxious, slightly less depressed, less paranoid, working on coping skills, denies current suicidal thoughts    Assessment/Plan   Principal Problem:    Major depressive disorder, recurrent, severe with psychotic features ([facility] [address] )  Active Problems:    JEF (generalized anxiety disorder)    Post-traumatic stress disorder, chronic    Panic disorder with agoraphobia    Learning disability    Medical clearance for psychiatric admission    Obesity (BMI 30 0-34  9)    Vapes nicotine containing substance      Recommended Treatment:     Planned medication and treatment changes: All current active medications have been reviewed  Encourage group therapy, milieu therapy and occupational therapy  Behavioral Health checks every 7 minutes  Increase Seroquel to 75 mg at bedtime to help with mood and paranoid thoughts   Titrate dose slowly    Continue all other medications:    Current Facility-Administered Medications   Medication Dose Route Frequency Provider Last Rate   • acetaminophen  650 mg Oral Q6H PRN Karlo Pringle PA-C     • acetaminophen  650 mg [facility]4H [facility]     • acetaminophen  975 mg Oral Q6H PRN Darroll Lather Merlin PA-C     • aluminum-magnesium hydroxide-simethicone  30 mL Oral Q4H PRN Karlo Pringle PA-C     • haloperidol lactate  2 5 mg Intramuscular Q4H PRN Max 4/day [facility]      And   • LORazepam  1 mg Intramuscular Q4H PRN Max 4/day [facility]      And   • benztropine  0 5 mg Intramuscular Q4H PRN Max 4/day [facility]     • haloperidol lactate  5 mg Intramuscular Q4H PRN Max 4/[facility]      And   • LORazepam  2 mg Intramuscular Q4H PRN Max 4/day [facility]      And   • benztropine  1 mg Intramuscular Q4H PRN Max 4/day [facility]     • benztropine  1 mg Intramuscular Q4H PRN Max 6/day [facility]     • benztropine  1 mg Oral Q4H PRN Max 6/day [facility]     • bisacodyl  10 mg Rectal Daily PRN Karlo Pringle PA-C     • cholecalciferol  2,000 Units Oral Daily Todd Zamorano MD     • hydrOXYzine HCL  50 mg Oral Q6H PRN Max 4/day [facility]      Or   • diphenhydrAMINE  50 mg Intramuscular Q6H PRN Karlo Pringle PA-C     • FLUoxetine  20 mg Oral Daily Todd Zamorano MD     • glycerin-hypromellose-  1 drop Both Eyes Q3H PRN Karlo Pringle PA-C     • haloperidol  1 mg Oral Q6H PRN Karlo Pringle PA-C     • haloperidol  2 5 mg Oral Q4H PRN Max 4/day [facility]     • haloperidol  5 mg Oral Q4H PRN Max 4/day [facility]     • hydrOXYzine HCL  100 mg Oral Q6H PRN Max 4/day Nayan Walker PA-C      Or   • LORazepam  2 mg Intramuscular Q6H PRN Karlo Pringle PA-C     • hydrOXYzine HCL  25 mg Oral Q6H PRN Max 4/day Nayan Walker PA-C     • melatonin  3 mg Oral HS PRN Brooks Aquas, MD     • nicotine polacrilex  2 mg Oral Q2H PRN Kali Ramos PA-C     • polyethylene glycol  17 g Oral Daily PRN Beronica Jansen PA-C     • propranolol  10 mg Oral Q8H PRN Alicia Walker PA-C     • QUEtiapine  75 mg Oral QPM Mercedes Kraus MD     • senna-docusate sodium  1 tablet Oral Daily PRN Beronica Jansen PA-C     • traZODone  50 mg Oral HS Mercedes Kraus MD       Risks / Benefits of Treatment:    Risks, benefits, and possible side effects of medications explained to patient and patient verbalizes understanding and agreement for treatment  Risks of medications in pregnancy explained if female patient  Patient verbalizes understanding and agrees to notify her doctor if she becomes pregnant  Counseling / Coordination of Care:    Patient's progress discussed with staff in treatment team meeting  Medications, treatment progress and treatment plan reviewed with patient  Medication changes discussed with patient      Addison Atkinson MD 11/20/22

## 2022-11-19 NOTE — NURSING NOTE
Patient denies SI, HI, AH and VH  During assessment patient states "i'm getting a little anxious"  She states that being around a crowd of people triggers her anxiety  Patient declined prn for anxiety and returned to her room  Patient was pleasant and cooperative with staff  Patient is medication and meal compliant  She is visible on the unit to address her needs  Patient denies any other needs at this time

## 2022-11-19 NOTE — PLAN OF CARE
Problem: Ineffective Coping  Goal: Identifies ineffective coping skills  Outcome: Progressing  Goal: Demonstrates healthy coping skills  Outcome: Progressing  Goal: Participates in unit activities  Description: Interventions:  - Provide therapeutic environment   - Provide required programming   - Redirect inappropriate behaviors   Outcome: Progressing     Problem: Risk for Self Injury/Neglect  Goal: Treatment Goal: Remain safe during length of stay, learn and adopt new coping skills, and be free of self-injurious ideation, impulses and acts at the time of discharge  Outcome: Progressing     Problem: Depression  Goal: Treatment Goal: Demonstrate behavioral control of depressive symptoms, verbalize feelings of improved mood/affect, and adopt new coping skills prior to discharge  Outcome: Progressing     Problem: Anxiety  Goal: Anxiety is at manageable level  Description: Interventions:  - Assess and monitor patient's anxiety level  - Monitor for signs and symptoms (heart palpitations, chest pain, shortness of breath, headaches, nausea, feeling jumpy, restlessness, irritable, apprehensive)  - Collaborate with interdisciplinary team and initiate plan and interventions as ordered    - [place] patient to unit/surroundings  - Explain treatment plan  - Encourage participation in care  - Encourage verbalization of concerns/fears  - Identify coping mechanisms  - Assist in developing anxiety-reducing skills  - Administer/offer alternative therapies  - Limit or eliminate stimulants  Outcome: Progressing     Problem: DISCHARGE PLANNING  Goal: Discharge to home or other facility with appropriate resources  Description: INTERVENTIONS:  - Identify barriers to discharge w/patient and caregiver  - Arrange for needed discharge resources and transportation as appropriate  - Identify discharge learning needs (meds, wound care, etc )  - Refer to Case Management Department for coordinating discharge planning if the patient needs post-hospital services based on physician/advanced practitioner order or complex needs related to functional status, cognitive ability, or social support system  Outcome: Progressing     Problem: Nutrition/Hydration-ADULT  Goal: Nutrient/Hydration intake appropriate for improving, restoring or maintaining nutritional needs  Description: Monitor and assess patient's nutrition/hydration status for malnutrition  Collaborate with interdisciplinary team and initiate plan and interventions as ordered  Monitor patient's weight and dietary intake as ordered or per policy  Utilize nutrition screening tool and intervene as necessary  Determine patient's food preferences and provide high-protein, high-caloric foods as appropriate       INTERVENTIONS:  - Monitor oral intake, urinary output, labs, and treatment plans  - Assess nutrition and hydration status and recommend course of action  - Evaluate amount of meals eaten  - Assist patient with eating if necessary   - Allow adequate time for meals  - Recommend/ encourage appropriate diets, oral nutritional supplements, and vitamin/mineral supplements  - Order, calculate, and assess calorie counts as needed  - Recommend, monitor, and adjust tube feedings and TPN/PPN based on assessed needs  - Assess need for intravenous fluids  - Provide specific nutrition/hydration education as appropriate  - Include patient/family/caregiver in decisions related to nutrition  Outcome: Progressing

## 2022-11-20 RX ADMIN — CHOLECALCIFEROL TAB 25 MCG (1000 UNIT) 2000 UNITS: 25 TAB at 08:16

## 2022-11-20 RX ADMIN — NICOTINE POLACRILEX 2 MG: 2 GUM, CHEWING ORAL at 15:46

## 2022-11-20 RX ADMIN — FLUOXETINE 20 MG: 20 CAPSULE ORAL at 08:16

## 2022-11-20 RX ADMIN — QUETIAPINE FUMARATE 75 MG: 50 TABLET ORAL at 17:05

## 2022-11-20 RX ADMIN — NICOTINE POLACRILEX 2 MG: 2 GUM, CHEWING ORAL at 20:51

## 2022-11-20 RX ADMIN — NICOTINE POLACRILEX 2 MG: 2 GUM, CHEWING ORAL at 13:22

## 2022-11-20 RX ADMIN — TRAZODONE HYDROCHLORIDE 50 MG: 50 TABLET ORAL at 21:12

## 2022-11-20 RX ADMIN — NICOTINE POLACRILEX 2 MG: 2 GUM, CHEWING ORAL at 08:16

## 2022-11-20 RX ADMIN — NICOTINE POLACRILEX 2 MG: 2 GUM, CHEWING ORAL at 11:01

## 2022-11-20 NOTE — NURSING NOTE
Patient reports mild depression  Patient denies anxiety, SI, HI, AH and VH  Patient's affect brighten upon approach  She states "I feel much better today, my medication is working"  She is pleasant and cooperative with staff  Patient is medication and meal compliant  Patient is visible on unit to address needs  Patient denies any additional needs at this time

## 2022-11-20 NOTE — PROGRESS NOTES
[facility] Group Note     11/20/22 [id number]   Activity/Group Checklist   Group Other (Comment)  ([place])   Attendance Attended   Attendance Duration (min) 16-30  (arrived late to group)   Interactions Interacted appropriately   Affect/Mood Appropriate;Calm   Goals Achieved Able to listen to others; Able to engage in interactions; Able to recieve feedback; Able to give feedback to another  (received resources/benefited from social presence of group)

## 2022-11-20 NOTE — PLAN OF CARE
Problem: Ineffective Coping  Goal: Identifies ineffective coping skills  Outcome: Progressing  Goal: Demonstrates healthy coping skills  Outcome: Progressing  Goal: Participates in unit activities  Description: Interventions:  - Provide therapeutic environment   - Provide required programming   - Redirect inappropriate behaviors   Outcome: Progressing     Problem: Risk for Self Injury/Neglect  Goal: Treatment Goal: Remain safe during length of stay, learn and adopt new coping skills, and be free of self-injurious ideation, impulses and acts at the time of discharge  Outcome: Progressing     Problem: Depression  Goal: Treatment Goal: Demonstrate behavioral control of depressive symptoms, verbalize feelings of improved mood/affect, and adopt new coping skills prior to discharge  Outcome: Progressing     Problem: Anxiety  Goal: Anxiety is at manageable level  Description: Interventions:  - Assess and monitor patient's anxiety level  - Monitor for signs and symptoms (heart palpitations, chest pain, shortness of breath, headaches, nausea, feeling jumpy, restlessness, irritable, apprehensive)  - Collaborate with interdisciplinary team and initiate plan and interventions as ordered    - [place] patient to unit/surroundings  - Explain treatment plan  - Encourage participation in care  - Encourage verbalization of concerns/fears  - Identify coping mechanisms  - Assist in developing anxiety-reducing skills  - Administer/offer alternative therapies  - Limit or eliminate stimulants  Outcome: Progressing     Problem: DISCHARGE PLANNING  Goal: Discharge to home or other facility with appropriate resources  Description: INTERVENTIONS:  - Identify barriers to discharge w/patient and caregiver  - Arrange for needed discharge resources and transportation as appropriate  - Identify discharge learning needs (meds, wound care, etc )  - Refer to Case Management Department for coordinating discharge planning if the patient needs post-hospital services based on physician/advanced practitioner order or complex needs related to functional status, cognitive ability, or social support system  Outcome: Progressing     Problem: Nutrition/Hydration-ADULT  Goal: Nutrient/Hydration intake appropriate for improving, restoring or maintaining nutritional needs  Description: Monitor and assess patient's nutrition/hydration status for malnutrition  Collaborate with interdisciplinary team and initiate plan and interventions as ordered  Monitor patient's weight and dietary intake as ordered or per policy  Utilize nutrition screening tool and intervene as necessary  Determine patient's food preferences and provide high-protein, high-caloric foods as appropriate       INTERVENTIONS:  - Monitor oral intake, urinary output, labs, and treatment plans  - Assess nutrition and hydration status and recommend course of action  - Evaluate amount of meals eaten  - Assist patient with eating if necessary   - Allow adequate time for meals  - Recommend/ encourage appropriate diets, oral nutritional supplements, and vitamin/mineral supplements  - Order, calculate, and assess calorie counts as needed  - Recommend, monitor, and adjust tube feedings and TPN/PPN based on assessed needs  - Assess need for intravenous fluids  - Provide specific nutrition/hydration education as appropriate  - Include patient/family/caregiver in decisions related to nutrition  Outcome: Progressing

## 2022-11-20 NOTE — NURSING NOTE
Patient's intermittently visible on the unit  Compliant with scheduled night meds  Able to make needs known  Denies all  Will continue to monitor

## 2022-11-21 RX ORDER — QUETIAPINE FUMARATE 100 MG/1
100 TABLET, FILM COATED ORAL EVERY EVENING
Status: DISCONTINUED | OUTPATIENT
Start: 2022-11-21 | End: 2022-11-23 | Stop reason: HOSPADM

## 2022-11-21 RX ADMIN — QUETIAPINE FUMARATE 100 MG: 100 TABLET ORAL at 17:02

## 2022-11-21 RX ADMIN — TRAZODONE HYDROCHLORIDE 50 MG: 50 TABLET ORAL at 21:05

## 2022-11-21 RX ADMIN — NICOTINE POLACRILEX 2 MG: 2 GUM, CHEWING ORAL at 21:19

## 2022-11-21 RX ADMIN — NICOTINE POLACRILEX 2 MG: 2 GUM, CHEWING ORAL at 15:00

## 2022-11-21 RX ADMIN — CHOLECALCIFEROL TAB 25 MCG (1000 UNIT) 2000 UNITS: 25 TAB at 08:20

## 2022-11-21 RX ADMIN — NICOTINE POLACRILEX 2 MG: 2 GUM, CHEWING ORAL at 19:13

## 2022-11-21 RX ADMIN — NICOTINE POLACRILEX 2 MG: 2 GUM, CHEWING ORAL at 08:22

## 2022-11-21 RX ADMIN — NICOTINE POLACRILEX 2 MG: 2 GUM, CHEWING ORAL at 12:23

## 2022-11-21 RX ADMIN — FLUOXETINE 20 MG: 20 CAPSULE ORAL at 08:20

## 2022-11-21 RX ADMIN — NICOTINE POLACRILEX 2 MG: 2 GUM, CHEWING ORAL at 17:06

## 2022-11-21 NOTE — NURSING NOTE
Patient's visible on the unit and social with peers  Compliant with scheduled meds  Denies all this shift  Will continue to monitor

## 2022-11-21 NOTE — NURSING NOTE
Pt denies psych symptoms  Pt has no complaints or concerns  Pt is social with roommate and staff  Pt mainly isolative to room reading a book, with brief intervals in the milieu for meals and needs  Medication and meal compliant

## 2022-11-21 NOTE — PROGRESS NOTES
11/21/22 0915   Team Meeting   Meeting Type Daily Rounds   Team Members Present   Team Members Present Physician;Nurse;   Physician Team Member Misbah Meyers   Nursing Team Member Tammy Werner   Social Work Team Member Jeri morales   Patient/Family Present   Patient Present No   Patient's Family Present No   Denies all psychiatric symptoms  Calm and cooperative on the unit  Medication and meal compliant  Reported anxiety and mild depression that improved over weekend  Discontinued Melatonin and started Trazodone  Increased Seroquel to 75  Discharge planned for Wednesday

## 2022-11-21 NOTE — PROGRESS NOTES
Progress Note - [facility] 33 y o  female MRN: [id number]  Unit/Bed#: -02 Encounter: [id number]    Assessment/Plan   Principal Problem:    Major depressive disorder, recurrent, severe with psychotic features ([address] )  Active Problems:    Medical clearance for psychiatric admission    Obesity (BMI 30 0-34  9)    Vapes nicotine containing substance    JEF (generalized anxiety disorder)    Post-traumatic stress disorder, chronic    Panic disorder with agoraphobia    Learning disability      Recommended Treatment:   • Will make the following medication changes:   o Increase seroquel to 100mg HS for mood and paranoia  • Continue with pharmacotherapy, group therapy, milieu therapy and occupational therapy  • Continue to assess for adverse medication side effects  • Encourage Charline Tam to participate in nonverbal forms of therapy including journaling and art/music therapy  • Continue frequent safety checks and vitals per unit protocol  • Continue to engage CM/SW to assist with collateral, disposition planning, and the implementation of an individualized, patient-centered plan of care  • Continue medical management by medical team   • Case discussed with treatment team     Legal Status: 201  ------------------------------------------------------------    Subjective: All documentation including nursing notes, medication history to ensure medication adherence on the unit, labs, and vitals were reviewed  Over the past 24 hours per nursing report, Shailesh Nichole has been visible and cooperative on the unit and compliant with medications  She attended one group yesterday  Overnight she remained in behavioral control and was noted to sleep throughout the night  Shailesh Nichole was evaluated this morning for continuity of care and no acute distress was noted throughout the evaluation  She reports feeling "better" today, but feels she has some cold-like symptoms because her room is cold   Shailesh Nichole notes having adequate sleep  She endorses having an improved appetite  Yasmin Carrizales has been  taking all medications as prescribed and reports no side effects  She reports 3/10 depression and anxiety today and feels her paranoid thoughts are improving since she has been hospitalized  Today, Yasmin Carrizales is consenting for safety on the unit  Yasmin Carrizales denies suicidal ideations, and denies homicidal ideations  Regarding hallucinations, Yasmin Carrizales denies auditory or visual hallucinations  PRNs overnight: nicorette gum   VS: Reviewed, within normal limits    Progress Toward Goals: slow improvement    Psychiatric Review of Systems:  Behavior over the last 24 hours:  improved  Sleep: normal  Appetite: improving - eating % of meals  Medication side effects: No   ROS: denies any shortness of breath or chest pain, all other systems are negative    Vital signs in last 24 hours:  Temp:  [98 1 °F (36 7 °C)] 98 1 °F (36 7 °C)  HR:  [] 100  Resp:  [16] 16  BP: (119-123)/(57-75) 123/75    Laboratory results:  I have personally reviewed all pertinent laboratory/tests results  No results found for this or any previous visit (from the past 48 hour(s))        Mental Status Evaluation:    Appearance:  age appropriate, casually dressed, adequate grooming, poor eye contact   Behavior:  cooperative, calm   Speech:  normal rate, normal pitch, soft   Mood:  "better"   Affect:  constricted   Thought Process:  organized, logical, goal directed   Associations: intact associations   Thought Content:  no overt delusions, paranoia improving   Perceptual Disturbances: Denies auditory or visual hallucinations and Does not appear to be responding to internal stimuli   Risk Potential: Suicidal ideation - None  Homicidal ideation - None  Potential for aggression - No   Sensorium:  oriented to person, place and time/date   Memory:  recent and remote memory grossly intact   Consciousness:  alert and awake   Attention/Concentration: attention span and concentration are age appropriate   Insight:  impaired but improving   Judgment: impaired but improving   Gait/Station: normal gait/station   Motor Activity: no abnormal movements       Current Medications:  Current Facility-Administered Medications   Medication Dose Route Frequency Provider Last Rate   • acetaminophen  650 mg Oral Q6H PRN Beronica Jansen PA-C     • acetaminophen  650 mg Oral Q4H PRN Beronica Jansen PA-C     • acetaminophen  975 mg Oral Q6H PRN Alicia Boyer PA-C     • aluminum-magnesium hydroxide-simethicone  30 mL Oral Q4H PRN Beronica Jansen PA-C     • haloperidol lactate  2 5 mg Intramuscular Q4H PRN Max 4/day Alicia Walker PA-C      And   • LORazepam  1 mg Intramuscular Q4H PRN Max 4/day Alicia Walker PA-C      And   • benztropine  0 5 mg Intramuscular Q4H PRN Max 4/day Alicia Walker PA-C     • haloperidol lactate  5 mg Intramuscular Q4H PRN Max 4/day Alicia Walker PA-C      And   • LORazepam  2 mg Intramuscular Q4H PRN Max 4/day Alicia Walker PA-C      And   • benztropine  1 mg Intramuscular Q4H PRN Max 4/day Alicia Walker PA-C     • benztropine  1 mg Intramuscular Q4H PRN Max 6/day Alicia Walker PA-C     • benztropine  1 mg Oral Q4H PRN Max 6/day Alicia Walker PA-C     • bisacodyl  10 mg Rectal Daily PRN Beronica Jansen PA-C     • cholecalciferol  2,000 Units Oral Daily Mercedes Kraus MD     • hydrOXYzine HCL  50 mg Oral Q6H PRN Max 4/day Alicia Walker PA-C      Or   • diphenhydrAMINE  50 mg Intramuscular Q6H PRN Beronica Jansen PA-C     • FLUoxetine  20 mg Oral Daily Mercedes Kraus MD     • glycerin-hypromellose-  1 drop Both Eyes Q3H PRN Beronica Jansen PA-C     • haloperidol  1 mg Oral Q6H PRN Beronica Jansen PA-C     • haloperidol  2 5 mg Oral Q4H PRN Max 4/day Alicia Walker PA-C     • haloperidol  5 mg Oral Q4H PRN Max 4/day Beronica Jansen PA-C     • hydrOXYzine HCL  100 mg Oral Q6H PRN Max 4/day Luc Walker PA-C      Or   • LORazepam  2 mg Intramuscular Q6H PRN Ana Mcrae PA-C     • hydrOXYzine HCL  25 mg Oral Q6H PRN Max 4/day Luc Walker PA-C     • melatonin  3 mg Oral HS PRN Jessica Du MD     • nicotine polacrilex  2 mg Oral Q2H PRN Gerlean Seip, PA-C     • polyethylene glycol  17 g Oral Daily PRN Ana Mcrae PA-C     • propranolol  10 mg Oral Q8H PRN Ana Mcrae PA-C     • QUEtiapine  100 mg Oral QPM Sarai Johnson MD     • senna-docusate sodium  1 tablet Oral Daily PRN Ana Mcrae PA-C     • traZODone  50 mg Oral HS Jessica Du MD         Suicide/Homicide Risk Assessment:  Risk of Harm to Self:   Nursing Suicide Risk Assessment Last 24 hours: C-SSRS Risk (Since Last Contact)  Calculated C-SSRS Risk Score (Since Last Contact): No Risk Indicated  Current Specific Risk Factors include: diagnosis of depression, current depressive symptoms, current anxiety symptoms, presence of paranoid ideation  Protective Factors: no current suicidal plan or intent, ability to communicate with staff on the unit, able to contract for safety on the unit, taking medications as ordered on the unit, improved anxiety symptoms  Based on today's assessment, Chacorta Contreras presents the following risk of harm to self: low    Risk of Harm to Others:  Nursing Homicide Risk Assessment: Violence Risk to Others: Denies within past 6 months  Current Specific Risk Factors include: none  Protective Factors: no current homicidal ideation, able to communicate with staff on the unit, improved mood, compliant with medications on the unit as ordered, compliant with unit milieu, willing to continue psychiatric treatment  Based on today's assessment, Chacorta Contreras presents the following risk of harm to others: minimal    The following interventions are recommended: behavioral checks every 7 minutes, continued hospitalization on locked unit    Behavioral Health Medications: All current active meds have been reviewed  Changes as in plan section above  Risks, benefits and possible side effects of Medications:   Risks, benefits, and possible side effects of medications explained to patient and patient verbalizes understanding  Counseling / Coordination of Care:  Patient's progress discussed with staff in treatment team meeting  Medications, treatment progress and treatment plan reviewed with patient  Amena Motta MD 11/21/22  Psychiatry Resident, PGY-II    This note was completed in part utilizing M-Modal Fluency Direct Software  Grammatical, translation, syntax errors, random word insertions, spelling mistakes, and incomplete sentences may be an occasional consequence of this system secondary to software limitations with voice recognition, ambient noise, and hardware issues  If you have any questions or concerns about the content, text, or information contained within the body of this dictation, please contact the provider for clarification

## 2022-11-21 NOTE — PROGRESS NOTES
[facility] Group Note     11/21/22 0930   Activity/Group Checklist   Group Life Skills  (Music and Lyrics)   Attendance Attended   Attendance Duration (min) 16-30  (arrived late to group/pulled by clinician)   Interactions Interacted appropriately  (but reserved and verbally scant)   Affect/Mood Appropriate;Calm  (Attentive)   Goals Achieved Identified feelings; Able to listen to others; Able to engage in interactions; Able to reflect/comment on own behavior;Able to recieve feedback; Able to give feedback to another

## 2022-11-21 NOTE — NURSING NOTE
Pt is med and meal compliant,is visible on the unit and attending groups  Pt denies all psych symptoms, and claims "it's been a good day"   Pt described her mood as "significantly improved", following a meeting she had earlier in the day  No other need were expresses at this time  Medication and meal compliant

## 2022-11-21 NOTE — PLAN OF CARE
Problem: Ineffective Coping  Goal: Identifies ineffective coping skills  Outcome: Progressing  Goal: Demonstrates healthy coping skills  Outcome: Progressing     Problem: Risk for Self Injury/Neglect  Goal: Treatment Goal: Remain safe during length of stay, learn and adopt new coping skills, and be free of self-injurious ideation, impulses and acts at the time of discharge  Outcome: Progressing     Problem: Depression  Goal: Treatment Goal: Demonstrate behavioral control of depressive symptoms, verbalize feelings of improved mood/affect, and adopt new coping skills prior to discharge  Outcome: Progressing     Problem: Anxiety  Goal: Anxiety is at manageable level  Description: Interventions:  - Assess and monitor patient's anxiety level  - Monitor for signs and symptoms (heart palpitations, chest pain, shortness of breath, headaches, nausea, feeling jumpy, restlessness, irritable, apprehensive)  - Collaborate with interdisciplinary team and initiate plan and interventions as ordered  - [place] patient to unit/surroundings  - Explain treatment plan  - Encourage participation in care  - Encourage verbalization of concerns/fears  - Identify coping mechanisms  - Assist in developing anxiety-reducing skills  - Administer/offer alternative therapies  - Limit or eliminate stimulants  Outcome: Progressing     Problem: Nutrition/Hydration-ADULT  Goal: Nutrient/Hydration intake appropriate for improving, restoring or maintaining nutritional needs  Description: Monitor and assess patient's nutrition/hydration status for malnutrition  Collaborate with interdisciplinary team and initiate plan and interventions as ordered  Monitor patient's weight and dietary intake as ordered or per policy  Utilize nutrition screening tool and intervene as necessary  Determine patient's food preferences and provide high-protein, high-caloric foods as appropriate       INTERVENTIONS:  - Monitor oral intake, urinary output, labs, and treatment plans  - Assess nutrition and hydration status and recommend course of action  - Evaluate amount of meals eaten  - Assist patient with eating if necessary   - Allow adequate time for meals  - Recommend/ encourage appropriate diets, oral nutritional supplements, and vitamin/mineral supplements  - Order, calculate, and assess calorie counts as needed  - Recommend, monitor, and adjust tube feedings and TPN/PPN based on assessed needs  - Assess need for intravenous fluids  - Provide specific nutrition/hydration education as appropriate  - Include patient/family/caregiver in decisions related to nutrition  Outcome: Progressing

## 2022-11-22 RX ADMIN — NICOTINE POLACRILEX 2 MG: 2 GUM, CHEWING ORAL at 17:25

## 2022-11-22 RX ADMIN — CHOLECALCIFEROL TAB 25 MCG (1000 UNIT) 2000 UNITS: 25 TAB at 08:09

## 2022-11-22 RX ADMIN — QUETIAPINE FUMARATE 100 MG: 100 TABLET ORAL at 17:25

## 2022-11-22 RX ADMIN — NICOTINE POLACRILEX 2 MG: 2 GUM, CHEWING ORAL at 08:19

## 2022-11-22 RX ADMIN — NICOTINE POLACRILEX 2 MG: 2 GUM, CHEWING ORAL at 10:45

## 2022-11-22 RX ADMIN — NICOTINE POLACRILEX 2 MG: 2 GUM, CHEWING ORAL at 13:06

## 2022-11-22 RX ADMIN — NICOTINE POLACRILEX 2 MG: 2 GUM, CHEWING ORAL at 15:12

## 2022-11-22 RX ADMIN — FLUOXETINE 20 MG: 20 CAPSULE ORAL at 08:09

## 2022-11-22 RX ADMIN — TRAZODONE HYDROCHLORIDE 50 MG: 50 TABLET ORAL at 21:13

## 2022-11-22 RX ADMIN — NICOTINE POLACRILEX 2 MG: 2 GUM, CHEWING ORAL at 21:13
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
## 2022-11-22 NOTE — PROGRESS NOTES
11/22/22 0814   Team Meeting   Meeting Type Daily Rounds   Team Members Present   Team Members Present Physician;Nurse;; Other (Discipline and Name)   Physician Team Member Alissa Vanessa   Nursing Team Member [facility] [address] Work Team Member Dionte Ricci   Other (Discipline and Name) [facility]   Patient/Family Present   Patient Present No   Patient's Family Present Yes     Patient has improved mood, decreased anxiety, attends groups, using coping skills   D/C 11/23

## 2022-11-22 NOTE — PROGRESS NOTES
11/22/22 1100   Activity/Group Checklist   Group   (recovery group)   Attendance Attended   Attendance Duration (min) 46-60   Interactions Interacted appropriately   Affect/Mood Appropriate   Goals Achieved Discussed coping strategies; Discussed self-esteem issues; Displayed empathy;Able to listen to others; Able to engage in interactions; Able to reflect/comment on own behavior;Able to self-disclose; Able to recieve feedback; Able to give feedback to another

## 2022-11-22 NOTE — NURSING NOTE
Pt was calm upon approach this am, continues to be med/meal complaint, states she is experiencing mild anxiety,  But denies all other psych symptoms or PRN's  Patients mentioned she is "lookoig forward " to tomorrow d/c and has no other needs expressed

## 2022-11-22 NOTE — PLAN OF CARE
Problem: Ineffective Coping  Goal: Identifies ineffective coping skills  Outcome: Progressing  Goal: Demonstrates healthy coping skills  Outcome: Progressing     Problem: Risk for Self Injury/Neglect  Goal: Treatment Goal: Remain safe during length of stay, learn and adopt new coping skills, and be free of self-injurious ideation, impulses and acts at the time of discharge  Outcome: Progressing     Problem: Depression  Goal: Treatment Goal: Demonstrate behavioral control of depressive symptoms, verbalize feelings of improved mood/affect, and adopt new coping skills prior to discharge  Outcome: Progressing     Problem: Anxiety  Goal: Anxiety is at manageable level  Description: Interventions:  - Assess and monitor patient's anxiety level  - Monitor for signs and symptoms (heart palpitations, chest pain, shortness of breath, headaches, nausea, feeling jumpy, restlessness, irritable, apprehensive)  - Collaborate with interdisciplinary team and initiate plan and interventions as ordered  - [place] patient to unit/surroundings  - Explain treatment plan  - Encourage participation in care  - Encourage verbalization of concerns/fears  - Identify coping mechanisms  - Assist in developing anxiety-reducing skills  - Administer/offer alternative therapies  - Limit or eliminate stimulants  Outcome: Progressing     Problem: Nutrition/Hydration-ADULT  Goal: Nutrient/Hydration intake appropriate for improving, restoring or maintaining nutritional needs  Description: Monitor and assess patient's nutrition/hydration status for malnutrition  Collaborate with interdisciplinary team and initiate plan and interventions as ordered  Monitor patient's weight and dietary intake as ordered or per policy  Utilize nutrition screening tool and intervene as necessary  Determine patient's food preferences and provide high-protein, high-caloric foods as appropriate       INTERVENTIONS:  - Monitor oral intake, urinary output, labs, and treatment plans  - Assess nutrition and hydration status and recommend course of action  - Evaluate amount of meals eaten  - Assist patient with eating if necessary   - Allow adequate time for meals  - Recommend/ encourage appropriate diets, oral nutritional supplements, and vitamin/mineral supplements  - Order, calculate, and assess calorie counts as needed  - Recommend, monitor, and adjust tube feedings and TPN/PPN based on assessed needs  - Assess need for intravenous fluids  - Provide specific nutrition/hydration education as appropriate  - Include patient/family/caregiver in decisions related to nutrition  Outcome: Progressing

## 2022-11-22 NOTE — NURSING NOTE
Pt denies psych symptoms  Pt has no complaints or concerns  Pt is looking forward to d/c tomorrow  Pt is calm, cooperative and pleasant  Pt is visible in the milieu and social with peers  Medication and meal compliant

## 2022-11-22 NOTE — PROGRESS NOTES
Progress Note - [facility] 33 y o  female MRN: [id number]  Unit/Bed#: -02 Encounter: [id number]    Assessment/Plan   Principal Problem:    Major depressive disorder, recurrent, severe with psychotic features ([facility] [address] )  Active Problems:    Medical clearance for psychiatric admission    Obesity (BMI 30 0-34  9)    Vapes nicotine containing substance    JEF (generalized anxiety disorder)    Post-traumatic stress disorder, chronic    Panic disorder with agoraphobia    Learning disability      Recommended Treatment:   • No psychopharmacologic changes necessary at this time; will continue to assess for further optimization  • Continue with current medications:  o Seroquel 100mg HS for anxiety, mood, paranoia  o Prozac 20mg daily for mood and anxiety  o Trazodone 50mg HS for insomnia  • Encourage Verenice Lopes to participate in nonverbal forms of therapy including journaling and art/music therapy, and encourage participation in group, occupational, and milieu therapy  • Continue frequent safety checks and vitals per unit protocol  • Continue to collaborate with CM/SW to assist with collateral, disposition planning, and the implementation of an individualized, patient-centered plan of care  • Continue medical management by medical team as indicated  • Case discussed with treatment team   • Disposition: Pending, will return to previous living arrangement, tentative discharge date of 11/23/22    Legal Status: 201  ------------------------------------------------------------    Subjective: All documentation including nursing notes, medication history to ensure medication adherence on the unit, labs, and vitals were reviewed  Over the past 24 hours per nursing report, Jackie Kendrick has been cooperative on the unit and compliant with medications  She has been attending groups and has been reporting mood improvements to staff    She reported to nursing this morning that she felt the seroquel increase was the best thing that could have happened  Overnight she was noted to sleep throughout the night    Diego Montana was evaluated this morning for continuity of care and no acute distress was noted throughout the evaluation  She reports feeling "good " Diego Montana notes having adequate appetite and sleep  Diego Montana has been  taking all medications as prescribed and reports no side effects  She was found reading a book and states she is reading "Tata Birks " She is brighter than previous and future oriented  She reports speaking to her job and plans to return to work on Monday  She plans to get her medications after discharge and attend her appointments and focus on her future  She states she is "excited to go home and start my new life "     Today, Diego Montana is consenting for safety on the unit  Diego Montana denies suicidal ideations, and denies homicidal ideations  Regarding hallucinations, Diego Montana denies auditory or visual hallucinations    PRNs overnight: nicorette gum    VS: Reviewed, HR= 112 yesterday , otherwise within normal limits    Progress Toward Goals: improvement in depressive symptoms and paranoia, brighter affect    Psychiatric Review of Systems:  Behavior over the last 24 hours:  improved  Sleep: normal  Appetite: adequate, % of meals consumed  Medication side effects: No   ROS: denies any headache, shortness of breath or chest pain, all other systems are negative    Vital signs in last 24 hours:  Temp:  [97 8 °F (36 6 °C)-98 2 °F (36 8 °C)] 98 2 °F (36 8 °C)  HR:  [] 95  Resp:  [16] 16  BP: (114-128)/(77-79) 124/77    Laboratory results:  I have personally reviewed all pertinent laboratory/tests results  No results found for this or any previous visit (from the past 48 hour(s))        Mental Status Evaluation:    Appearance:  age appropriate, casually dressed, adequate grooming   Behavior:  cooperative, calm, improved eye contact   Speech:  normal rate, normal pitch, soft   Mood:  "great"   Affect: slightly brighter   Thought Process:  organized, logical, goal directed   Associations: intact associations   Thought Content:  no overt delusions, paranoia improving   Perceptual Disturbances: Denies auditory or visual hallucinations and Does not appear to be responding to internal stimuli   Risk Potential: Suicidal ideation - None at present  Homicidal ideation - None at present  Potential for aggression - No   Sensorium:  oriented to person, place and time/date   Memory:  recent and remote memory grossly intact   Consciousness:  alert and awake   Attention/Concentration: attention span and concentration are age appropriate   Insight:  improving   Judgment: improving   Gait/Station: normal gait/station   Motor Activity: no abnormal movements       Current Medications:  Current Facility-Administered Medications   Medication Dose Route Frequency Provider Last Rate   • acetaminophen  650 mg Oral Q6H PRN Vlad Astorga PA-C     • acetaminophen  650 mg Oral Q4H PRN Vlad Astorga PA-C     • acetaminophen  975 mg Oral Q6H PRN Annelle Galeazzi [place], PA-C     • aluminum-magnesium hydroxide-simethicone  30 mL Oral Q4H PRN Vlad Astorga PA-C     • haloperidol lactate  2 5 mg Intramuscular Q4H PRN Max 4/day Annelle Galeazzi [place], PA-C      And   • LORazepam  1 mg Intramuscular Q4H PRN Max 4/day Vlad Astorga PA-C      And   • benztropine  0 5 mg Intramuscular Q4H PRN Max 4/day Annelle Galeazzi [place], PA-C     • haloperidol lactate  5 mg Intramuscular Q4H PRN Max 4/day Annelle Galeazzi [place], PA-C      And   • LORazepam  2 mg Intramuscular Q4H PRN Max 4/day Vlad Astorga PA-C      And   • benztropine  1 mg Intramuscular Q4H PRN Max 4/day Annelle Galeazzi [place], PA-C     • benztropine  1 mg Intramuscular Q4H PRN Max 6/day Vlad Astorga PA-C     • benztropine  1 mg Oral Q4H PRN Max 6/day Annelle Galeazzi [place], PA-C     • bisacodyl  10 mg Rectal Daily PRN Vlad Astorga PA-C     • cholecalciferol 2,000 Units Oral Daily Louie Rosenthal MD     • hydrOXYzine HCL  50 mg Oral Q6H PRN Max 4/day [place], [place]      Or   • diphenhydrAMINE  50 mg Intramuscular Q6H PRN Sharda Page PA-C     • FLUoxetine  20 mg Oral Daily Louie Rosenthal MD     • glycerin-hypromellose-  1 drop Both Eyes Q3H PRN CHUCHO BlackC     • haloperidol  1 mg Oral Q6H PRN Sharda ARIANNA Page     • haloperidol  2 5 mg Oral Q4H PRN Max 4/day [facility], PA-C     • haloperidol  5 mg Oral Q4H PRN Max 4/[facility]     • hydrOXYzine HCL  100 mg Oral Q6H PRN Max 4/[facility]      Or   • LORazepam  2 mg Intramuscular Q6H PRN Sharda CHUCHO PageC     • hydrOXYzine HCL  25 mg Oral Q6H PRN Max 4/[facility]     • melatonin  3 mg Oral HS PRN Louie Rosenthal MD     • nicotine polacrilex  2 mg Oral Q2H PRN Dania Vigil PA-C     • polyethylene glycol  17 g Oral Daily PRN Sharda Page PA-C     • propranolol  10 mg Oral Q8H PRN Sharda Page PA-C     • QUEtiapine  100 mg Oral QPM Lyla Warner MD     • senna-docusate sodium  1 tablet Oral Daily PRN Sharda Page PA-C     • traZODone  50 mg Oral HS Louie Rosenthal MD         Suicide/Homicide Risk Assessment:  Risk of Harm to Self:   Nursing Suicide Risk Assessment Last 24 hours: C-SSRS Risk (Since Last Contact)  Calculated C-SSRS Risk Score (Since Last Contact): No Risk Indicated  Current Specific Risk Factors include: diagnosis of depression  Protective Factors: no current suicidal ideation, ability to communicate with staff on the unit, able to contract for safety on the unit, taking medications as ordered on the unit, improved mood, improved depressive symptoms, improved psychotic symptoms  Based on today's assessment, Lindsay Rodriguez presents the following risk of harm to self: low    Risk of Harm to Others:  Nursing Homicide Risk Assessment: Violence Risk to Others: Denies within past 6 months  Based on today's assessment, Ce Lees presents the following risk of harm to others: low    The following interventions are recommended: behavioral checks every 7 minutes, continued hospitalization on locked unit    Behavioral Health Medications: All current active meds have been reviewed  Changes as in plan section above  Risks, benefits and possible side effects of Medications:   No new medications     Counseling / Coordination of Care:  Patient's progress discussed with staff in treatment team meeting  Medications, treatment progress and treatment plan reviewed with patient  Importance of medication and treatment compliance reviewed with patient  Stacie Mcclain MD 11/22/22  Psychiatry Resident, PGY-II    This note was completed in part utilizing M-Modal Fluency Direct Software  Grammatical, translation, syntax errors, random word insertions, spelling mistakes, and incomplete sentences may be an occasional consequence of this system secondary to software limitations with voice recognition, ambient noise, and hardware issues  If you have any questions or concerns about the content, text, or information contained within the body of this dictation, please contact the provider for clarification

## 2022-11-23 VITALS
HEART RATE: 107 BPM | BODY MASS INDEX: 34.63 KG/M2 | DIASTOLIC BLOOD PRESSURE: 77 MMHG | TEMPERATURE: 97.5 F | HEIGHT: 60 IN | WEIGHT: 176.4 LBS | OXYGEN SATURATION: 97 % | SYSTOLIC BLOOD PRESSURE: 155 MMHG | RESPIRATION RATE: 18 BRPM

## 2022-11-23 PROBLEM — Z00.8 MEDICAL CLEARANCE FOR PSYCHIATRIC ADMISSION: Status: RESOLVED | Noted: 2020-04-10 | Resolved: 2022-11-23

## 2022-11-23 RX ORDER — FLUOXETINE HYDROCHLORIDE 20 MG/1
20 CAPSULE ORAL DAILY
Qty: 30 CAPSULE | Refills: 1 | Status: SHIPPED | OUTPATIENT
Start: 2022-11-24

## 2022-11-23 RX ORDER — QUETIAPINE FUMARATE 100 MG/1
100 TABLET, FILM COATED ORAL EVERY EVENING
Qty: 30 TABLET | Refills: 1 | Status: SHIPPED | OUTPATIENT
Start: 2022-11-23

## 2022-11-23 RX ORDER — MELATONIN
2000 DAILY
Qty: 60 TABLET | Refills: 0 | Status: SHIPPED | OUTPATIENT
Start: 2022-11-24

## 2022-11-23 RX ORDER — TRAZODONE HYDROCHLORIDE 50 MG/1
50 TABLET ORAL
Qty: 30 TABLET | Refills: 1 | Status: SHIPPED | OUTPATIENT
Start: 2022-11-23

## 2022-11-23 RX ADMIN — NICOTINE POLACRILEX 2 MG: 2 GUM, CHEWING ORAL at 11:00

## 2022-11-23 RX ADMIN — FLUOXETINE 20 MG: 20 CAPSULE ORAL at 08:09

## 2022-11-23 RX ADMIN — NICOTINE POLACRILEX 2 MG: 2 GUM, CHEWING ORAL at 08:09

## 2022-11-23 RX ADMIN — CHOLECALCIFEROL TAB 25 MCG (1000 UNIT) 2000 UNITS: 25 TAB at 08:09

## 2022-11-23 NOTE — PLAN OF CARE
Problem: Ineffective Coping  Goal: Identifies ineffective coping skills  Outcome: Completed  Goal: Demonstrates healthy coping skills  Outcome: Completed  Goal: Participates in unit activities  Description: Interventions:  - Provide therapeutic environment   - Provide required programming   - Redirect inappropriate behaviors   Outcome: Completed     Problem: Risk for Self Injury/Neglect  Goal: Treatment Goal: Remain safe during length of stay, learn and adopt new coping skills, and be free of self-injurious ideation, impulses and acts at the time of discharge  Outcome: Completed     Problem: Depression  Goal: Treatment Goal: Demonstrate behavioral control of depressive symptoms, verbalize feelings of improved mood/affect, and adopt new coping skills prior to discharge  Outcome: Completed     Problem: Anxiety  Goal: Anxiety is at manageable level  Description: Interventions:  - Assess and monitor patient's anxiety level  - Monitor for signs and symptoms (heart palpitations, chest pain, shortness of breath, headaches, nausea, feeling jumpy, restlessness, irritable, apprehensive)  - Collaborate with interdisciplinary team and initiate plan and interventions as ordered    - [place] patient to unit/surroundings  - Explain treatment plan  - Encourage participation in care  - Encourage verbalization of concerns/fears  - Identify coping mechanisms  - Assist in developing anxiety-reducing skills  - Administer/offer alternative therapies  - Limit or eliminate stimulants  Outcome: Completed     Problem: DISCHARGE PLANNING  Goal: Discharge to home or other facility with appropriate resources  Description: INTERVENTIONS:  - Identify barriers to discharge w/patient and caregiver  - Arrange for needed discharge resources and transportation as appropriate  - Identify discharge learning needs (meds, wound care, etc )  - Refer to Case Management Department for coordinating discharge planning if the patient needs post-hospital services based on physician/advanced practitioner order or complex needs related to functional status, cognitive ability, or social support system  Outcome: Completed     Problem: Nutrition/Hydration-ADULT  Goal: Nutrient/Hydration intake appropriate for improving, restoring or maintaining nutritional needs  Description: Monitor and assess patient's nutrition/hydration status for malnutrition  Collaborate with interdisciplinary team and initiate plan and interventions as ordered  Monitor patient's weight and dietary intake as ordered or per policy  Utilize nutrition screening tool and intervene as necessary  Determine patient's food preferences and provide high-protein, high-caloric foods as appropriate       INTERVENTIONS:  - Monitor oral intake, urinary output, labs, and treatment plans  - Assess nutrition and hydration status and recommend course of action  - Evaluate amount of meals eaten  - Assist patient with eating if necessary   - Allow adequate time for meals  - Recommend/ encourage appropriate diets, oral nutritional supplements, and vitamin/mineral supplements  - Order, calculate, and assess calorie counts as needed  - Recommend, monitor, and adjust tube feedings and TPN/PPN based on assessed needs  - Assess need for intravenous fluids  - Provide specific nutrition/hydration education as appropriate  - Include patient/family/caregiver in decisions related to nutrition  Outcome: Completed

## 2022-11-23 NOTE — CASE MANAGEMENT
Case Management Discharge Planning Note    Patient name Rachel Maddox  Location [facility] 377/[facility] 759-04 MRN [id number]  : 1990 Date 2022       Current Admission Date: 2022  Current Admission Diagnosis:Major depressive disorder, recurrent, severe with psychotic features [facility])   Patient Active Problem List    Diagnosis Date Noted   • Major depressive disorder, recurrent, severe with psychotic features ([address] ) 2022   • JEF (generalized anxiety disorder) 2022   • Post-traumatic stress disorder, chronic 2022   • Panic disorder with agoraphobia 2022   • Learning disability 2022   • Obesity (BMI 30 0-34 9) 2022   • Vapes nicotine containing substance 2022   • IUD (intrauterine device) in place 2021   • Vulvar candidiasis 2021   • History of postpartum hemorrhage 10/13/2017      LOS (days): 6  Geometric Mean LOS (GMLOS) (days):   Days to GMLOS:     OBJECTIVE:  Risk of Unplanned Readmission Score: 11 64         Current admission status: Inpatient Psych   Preferred Pharmacy:   [id number] [address] [facility] [address] [place] [address],[address] [facility] [address]  Phone: [phone] Fax: [phone] [address]  [facility] [address]  Phone: [phone] Fax: [phone]    Primary Care Provider: No primary care provider on file  Primary Insurance: ANASTASIA MCDONNELL PENDING  Secondary Insurance:     DISCHARGE DETAILS:    Discharge planning discussed with[de-identified] Patient and significant other       Patient is discharged from the unit on this day  Denied SI/HI, psychosis and/or A/V  No questions verbalized  Patient is in agreement with discharge  Patient provided with after care appointment, discharge instructions and medication regime reviewed  Affect                 Accompanied to the lobby where pt was met by significant other  Patient transported to home by friend      All belongings returned     Patient signed USZY for [facility] for referral                 Contacts  Patient Contacts: Tania Perez  Relationship to Patient[de-identified] Friend  Contact Method: Phone  Phone Number: [phone]  Reason/Outcome: Emergency Contact, Discharge Planning              Other Referral/Resources/Interventions Provided:  Referrals Provided[de-identified] Psychiatrist, Therapist  Government Services[de-identified] Medical Assistance    Would you like to participate in our [facility] service program?  : Yes
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
## 2022-11-23 NOTE — DISCHARGE SUMMARY
Discharge Summary - [facility] 33 y o  female MRN: [id number]  Unit/Bed#: [facility] 793-83 Encounter: [id number]     Admission Date:   Admission Orders (From admission, onward)     Ordered        11/17/22 1127  ED TO SAME [facility] UNIT (using Admission Navigator) - Admit Patient to [address] [place] [address]  Once                            Discharge Date: 11/23/22     Attending Psychiatrist: Inder Ayala MD     Admission Diagnosis:    Principal Problem:    Major depressive disorder, recurrent, severe with psychotic features ([facility] [address] )  Active Problems:    Obesity (BMI 30 0-34  9)    Vapes nicotine containing substance    JEF (generalized anxiety disorder)    Post-traumatic stress disorder, chronic    Panic disorder with agoraphobia    Learning disability      Discharge Diagnosis:     Principal Problem:    Major depressive disorder, recurrent, severe with psychotic features ([facility] [address] )  Active Problems:    Obesity (BMI 30 0-34  9)    Vapes nicotine containing substance    JEF (generalized anxiety disorder)    Post-traumatic stress disorder, chronic    Panic disorder with agoraphobia    Learning disability  Resolved Problems:    Medical clearance for psychiatric admission      Reason for Admission/HPI:   Guy Cordova is a 33 y o  female, with past  psychiatric history of depression, anxiety, PTSD, panic disorder, who initially presented to the [facility] at [address] with worsening depression, anxiety, paranoia, suicidal ideation with plan to cut self  Guy Cordova initially reported worsening depressed mood and anxiety with panic attacks, and difficulty attending to her activities of daily living  She also endorsed feelings of paranoia  Terrace Rout was admitted to the psychiatric unit on a voluntarily 201 commitment basis       For more detail, please refer to the full H&P completed by psychiatrist Inder Ayala MD on 11/18/22:  "Maricel Barriga is a 33 y o  female with a history of depression, anxiety, Panic Disorder and PTSD who was admitted to the inpatient psychiatric unit on a voluntary 201 commitment basis due to depression, anxiety, paranoid ideation, suicidal ideation with plan to cut self and self-abusive thoughts      Symptoms prior to admission included worsening depression, suicidal ideation, self-abusive thoughts, hopelessness, helplessness, poor concentration, poor appetite, difficulty sleeping, mood swings, paranoid ideation, anxiety symptoms, anxiety attacks, difficulty attending to activities of daily living, noncompliance with treatment and noncompliance with medications  Onset of symptoms was gradual starting 6 months ago with progressively worsening course since that time  Stressors preceding admission included relationship problems, ongoing anxiety, chronic mental illness and noncompliance with treatment  Arleen Sutton was brought in to ED by police after her boyfriend called police concerned about her worsening mood swings, depression and anxiety  Arleen Sutton reported in ED that she was overwhelmed with increased frequency of panic attacks and worsening depression  She also had poor appetite, was not sleeping and felt that she could not function at home  She wanted to restart psychiatric treatment and signed a voluntary commitment for inpatient treatment      On initial evaluation after admission to the inpatient psychiatric unit Arleen Sutton was still feeling depressed and hopeless  She also seemed very anxious and paranoid  She also reported that she was concerned with return of thoughts to cut herself and said that she would not feel safe if she was discharged home  She was able to contract for safety on the inpatient unit   She agreed to restart Prozac and Seroquel that she felt were beneficial in the past "      Past Medical History:   Diagnosis Date   • ADHD    • Anemia     with pregnancy    • Anxiety    • Depression    • Gestational diabetes     2017   • History of migraine headaches    • History of postpartum hemorrhage    • PTSD (post-traumatic stress disorder)      Past Surgical History:   Procedure Laterality Date   • NO PAST SURGERIES         Medications: All current active medications have been reviewed  Medications prior to admission:    Prior to Admission Medications   Prescriptions Last Dose Informant Patient Reported? Taking? clotrimazole (LOTRIMIN) 1 % cream Not Taking  No No   Sig: Apply topically 2 (two) times a day   Patient not taking: Reported on 2021   clotrimazole-betamethasone (LOTRISONE) 1-0 05 % cream Not Taking  No No   Sig: Apply topically 2 (two) times a day   Patient not taking: Reported on 2021   levonorgestrel (MIRENA) 20 MCG/24HR IUD   Yes Yes   Si each by Intrauterine route once   miconazole (MONISTAT-7) 2 % vaginal cream Not Taking  No No   Sig: Insert 1 applicator into the vagina daily at bedtime   Patient not taking: Reported on 2022   terconazole (TERAZOL 7) 0 4 % vaginal cream Not Taking  No No   Sig: Insert 1 applicator into the vagina daily at bedtime   Patient not taking: Reported on 2021   triamcinolone (KENALOG) 0 1 % ointment Not Taking  No No   Sig: Apply topically 2 (two) times a day   Patient not taking: Reported on 2022      Facility-Administered Medications: None       Allergies: Allergies   Allergen Reactions   • Macrobid [Nitrofurantoin] Swelling   • Ibuprofen Itching       Please refer to the initial H&P for full details  Vital signs in last 24 hours:    Temp:  [97 5 °F (36 4 °C)-98 1 °F (36 7 °C)] 97 5 °F (36 4 °C)  HR:  [107-113] 107  Resp:  [16-18] 18  BP: (135-155)/(77-81) 155/77    No intake or output data in the 24 hours ending 22 [address] Course: On admission, Conchita Rivas was admitted to the inpatient psychiatric unit and started on Behavioral Health checks every 7 minutes   During the hospitalization she was encouraged to attend individual therapy, group therapy, milieu therapy and occupational therapy  Upon admission she was seen by medical service for medical clearance for inpatient treatment and medical follow up  Ce Lees was started on Prozac 10mg daily for mood, Seroquel 50mg at bedtime for paranoia and anxiety and was continued on melatonin 10mg for insomnia  She was also started on vitamin D 2000 units daily for vitamin D deficiency, and trazodone 50mg HS for insomnia  Gissel's medications were titrated as appropriate until discharge, including:    • Vitamin-D 2000 units daily for deficiency  • Prozac 20 mg daily for mood and anxiety  • Seroquel 100 mg HS for anxiety and paranoia  • Trazodone 50 mg HS for insomnia    Prior to beginning of treatment medications risks and benefits and possible side effects including risk of parkinsonian symptoms, Tardive Dyskinesia and metabolic syndrome related to treatment with antipsychotic medications and risk of suicidality and serotonin syndrome related to treatment with antidepressants were reviewed with Ce Lees verbalized understanding and agreement for treatment  Gissel tolerated these medications with some initial feelings of "jitteriness" which quickly resolved, and no other acute side effects  The patient's mood brightened over the course of treatment, and she was seen in [facility] interacting appropriately with peers  Ce Lees did not demonstrate dangerous behavior to self or others during her inpatient stay  On the day of discharge, she denied suicidal ideation, intent or plan at the time of discharge and denied homicidal ideation, intent or plan at the time of discharge  There was no overt psychosis at the time of discharge  Paranoid ideation was resolved  Ce Lees was participating appropriately in milieu at the time of discharge  Sleep and appetite were improved  Ce Lees was tolerating medications and was not reporting any significant side effects at the time of discharge   She reported looking forward to going home to "start [her] new life without depression" and was looking forward to seeing her cats and boyfriend  She reported looking forward to returning to work  Since Jhonatan Reid was doing well at the end of the hospitalization, treatment team felt that she could be safely discharged to outpatient care  Prior to discharge  spoke with Gissel's boyfriend to address support and her readiness for discharge  The outpatient follow up with a therapist at [facility] on 12/1/22 was arranged by the unit  upon discharge  She was also referred to [facility] resources to obtain health insurance  I reviewed with Jhonatan Reid the importance of compliance with medications and outpatient treatment after discharge  and I reviewed with Jhonatan Reid crisis plan and safety plan upon discharge  Behavioral Health Medications: all current active meds have been reviewed  Discharge on Two Antipsychotic Medications: No    Labs/Imaging:   I have personally reviewed all pertinent laboratory/tests results      Most Recent Labs:   Lab Results   Component Value Date    WBC 8 90 11/18/2022    RBC 4 67 11/18/2022    HGB 13 6 11/18/2022    HCT 40 8 11/18/2022     11/18/2022    RDW 12 3 11/18/2022    NEUTROABS 5 79 11/18/2022    SODIUM 138 11/18/2022    K 3 8 11/18/2022     11/18/2022    CO2 29 11/18/2022    BUN 9 11/18/2022    CREATININE 0 77 11/18/2022    GLUC 105 (H) 11/18/2022    CALCIUM 9 3 11/18/2022    AST 50 (H) 11/18/2022    ALT 94 (H) 11/18/2022    ALKPHOS 51 11/18/2022    TP 7 1 11/18/2022    ALB 3 9 11/18/2022    TBILI 0 55 11/18/2022    CHOLESTEROL 193 11/18/2022    HDL 40 (L) 11/18/2022    TRIG 121 11/18/2022    LDLCALC 129 11/18/2022    NONHDLC 153 11/18/2022    QIU2SUOFJAZZ 1 130 11/18/2022    PREGUR Negative 12/09/2019    PREGSERUM Negative 11/18/2022    RPR Non-Reactive 01/07/2019    HGBA1C 5 4 11/18/2022     11/18/2022           Mental Status at time of Discharge:   Appearance:  age appropriate, dressed appropriately, looks stated age, tattooed, sitting comfortably in chair, adequate hygiene and grooming, cooperative with interview, good eye contact    Behavior:  cooperative and calm   Speech:  normal rate, normal volume, normal pitch, fluent, clear and coherent   Mood:  "excited"   Affect:  Full and appropriate range, reactive, brighter than previous   Language Within normal limits   Thought Process:  organized, logical, goal directed, normal rate of thoughts   Associations: intact associations      Thought Content:  No verbalized delusions   Perceptual Disturbances: Denies auditory or visual hallucinations and Does not appear to be responding to internal stimuli   Risk Potential: Denies suicidal or homicidal ideation, intent, or plan   Sensorium:  person, place, time and current situation   Cognition:  Grossly intact   Consciousness:  alert and awake   Attention: attention span and concentration were age appropriate   Insight:  fair   Judgment: fair   Intellect appears to be of average intelligence   Gait/Station: normal gait/station   Motor Activity: no abnormal movements     Suicide/Homicide Risk Assessment:  Risk of Harm to Self:   • The following ratings are based on assessment at the time of discharge  • Demographic risk factors include: never   • Historical Risk Factors include: history of depression, history of suicide attempts, history of traumatic experiences  • Current Specific Risk Factors include: recent inpatient psychiatric admission - being discharged today, recent suicidal ideation, diagnosis of depression  • Protective Factors: no current suicidal ideation, improved mood, improved psychotic symptoms, ability to adapt to change, ability to make plans for the future, compliant with medications, stable job, stable housing, good self-esteem, having a desire to be alive, having a sense of purpose or meaning in life, resiliency, responsibilities and duties to others, safe and stable living environment, strong relationships  • Weapons/Firearms: none  The following steps have been taken to ensure weapons are properly secured: not applicable  • Based on today's assessment, Sherrell Ellis presents the following risk of harm to self: low    Risk of Harm to Others:  • The following ratings are based on assessment at the time of discharge  • Demographic Risk Factors include: under age 36  • Historical Risk Factors include: none  • Current Specific Risk Factors include: multiple stressors  • Protective Factors: no current homicidal ideation, improved mood, no current psychotic symptoms, compliant with medications, willing to continue psychiatric treatment, no prior history of violence, stable living environment, strong relationships  • Weapons/Firearms: none  The following steps have been taken to ensure weapons are properly secured: not applicable  • Based on today's Amy Harvey presents the following risk of harm to others: minimal    The following interventions are recommended: outpatient follow up with a psychiatrist, outpatient follow up with a therapist    Discharge Medications:  See list above, as well as the after visit summary for all reconciled discharge medications provided to patient and family  Discharge instructions/Information to patient and family:   See after visit summary for information provided to patient and family  Provisions for Follow-Up Care:  See after visit summary for information related to follow-up care and any pertinent home health orders  Quirino Krishnamurthy MD 11/23/22  Psychiatry Resident, PGY-II    This note was completed in part utilizing [employer] Direct Software  Grammatical, translation, syntax errors, random word insertions, spelling mistakes, and incomplete sentences may be an occasional consequence of this system secondary to software limitations with voice recognition, ambient noise, and hardware issues   If you have any questions or concerns about the content, text, or information contained within the body of this dictation, please contact the provider for clarification

## 2022-11-23 NOTE — NURSING NOTE
Pt denies SI, HI, AH and VH  Pt has no complaints or concerns  Pt is looking forward to d/c later this morning  Pt stated "I will see someone outpatient for the frequent urination I am just ready to go today " Medication and meal compliant

## 2022-11-23 NOTE — DISCHARGE INSTR - APPOINTMENTS
Braden Segura or Anna, our Sreedhar and Jesus, will be calling you after your discharge, on the phone number that you provided  They will be available as an additional support, if needed  If you wish to speak with one of them, you may contact Valorie Mejia at [phone] or Vaishali Kearney at [phone]

## 2022-11-23 NOTE — DISCHARGE INSTR - OTHER ORDERS
You are heidi discharged home to [facility] Crisis Intervention: [address] Crisis Intervention: 424.113.2579  *National Suicide Prevention Lifeline:  6-325.986.8877  *Peer Support Talk Line (Seven days a week, 1:00 PM - 9:00 PM) Call: 613.828.5514 or Text: [address] on [address] [facility] ([facility]) HELPLINE: 273.939.7849/Website: www tammy org  *Substance Abuse and [facility]) [employer], which is a confidential, free, 24-hour-a-day, 365-day-a-year, information service for individuals and family members facing mental health and/or substance use disorders  This service provides referrals to local treatment facilities, support groups, and community-based organizations  Callers can also order free publications and other information  Call 8-512.798.7898/Website: www [facility] gov  *United Way 2-1-1: This is a toll free, confidential, 24-hour-a-day service which connects you to a community  in your area who can help you find services and resources that are available to you locally and provide critical services that can improve and save lives  Call: 211  /Website: https://[employer] Expert/      You have been approved for [employer] through [facility] for [address] treatment at [facility]  This includes individual therapy and medication management  Payment of medication is NOT included in your county funding  Therefore, you will pay for your medications out of pocket  Included are coupons for your medications   Please download the [employer] [facility] o your phone to utilize their cost saving program   When you arrive to the pharmacy today, please use coupons to reduce the cost of your medications

## 2022-11-23 NOTE — SOCIAL WORK
This writer spoke with Luz Elena Sandhu ([phone]) with [facility] to obtain [facility] funding for OP [address]  Kari Sheriff approved the patient  This writer scheduled OP services at [facility]  Patient is scheduled for OP services Intake

## 2022-11-23 NOTE — NURSING NOTE
Calm and cooperative  Social with select peers  Denies SI/HI/AVH  C/o painful, frequent urination   Will place on med list  Continued q7m checks for safety

## 2022-11-23 NOTE — PROGRESS NOTES
11/23/22 0935   Team Meeting   Meeting Type Daily Rounds   Team Members Present   Team Members Present Physician;Nurse;; Other (Discipline and Name)   Physician Team Member Ayesha Wolf   Nursing Team Member [place]   Social Work Team Member Kraig morales   Other (Discipline and Name) Micah Sorenson CC, Lacy Art Therapist   Patient/Family Present   Patient Present No   Patient's Family Present No   Calm and cooperative on the unit  Denies all psychiatric symptoms except expression of mild anxiety related to discharge  Discharge planned for today

## 2022-11-23 NOTE — NURSING NOTE
AVS printed and reviewed with pt, medications sent to [address] [place] [address]  Pt has no questions regarding AVS  Pt has no complaints or concerns  Pt is walking off unit with belongings by her side  Pt d/c with her boyfriend

## 2022-11-23 NOTE — BH TRANSITION RECORD
Contact Information: If you have any questions, concerns, pended studies, tests and/or procedures, or emergencies regarding your inpatient behavioral health visit  Please contact [address] [place] [address] behavioral health unit 3P [phone] and ask to speak to a , nurse or physician  A contact is available 24 hours/ 7 days a week at this number  Summary of Procedures Performed During your Stay:  Below is a list of major procedures performed during your hospital stay and a summary of results:  - Cardiac Procedures/Studies: EKG:  Sinus tachycardia    Pending Studies (From admission, onward)    None        Please follow up on the above pending studies with your PCP and/or referring provider

## 2023-07-11 ENCOUNTER — HOSPITAL ENCOUNTER (EMERGENCY)
Facility: HOSPITAL | Age: 33
End: 2023-07-12
Attending: EMERGENCY MEDICINE
Payer: COMMERCIAL

## 2023-07-11 DIAGNOSIS — F33.2 SEVERE EPISODE OF RECURRENT MAJOR DEPRESSIVE DISORDER, WITHOUT PSYCHOTIC FEATURES (HCC): Primary | ICD-10-CM

## 2023-07-11 LAB
AMPHETAMINES SERPL QL SCN: NEGATIVE
BARBITURATES UR QL: NEGATIVE
BENZODIAZ UR QL: NEGATIVE
COCAINE UR QL: NEGATIVE
ETHANOL EXG-MCNC: 0 MG/DL
EXT PREGNANCY TEST URINE: NEGATIVE
EXT. CONTROL: NORMAL
METHADONE UR QL: NEGATIVE
OPIATES UR QL SCN: NEGATIVE
OXYCODONE+OXYMORPHONE UR QL SCN: NEGATIVE
PCP UR QL: NEGATIVE
THC UR QL: NEGATIVE

## 2023-07-11 PROCEDURE — 82075 ASSAY OF BREATH ETHANOL: CPT | Performed by: EMERGENCY MEDICINE

## 2023-07-11 PROCEDURE — 81025 URINE PREGNANCY TEST: CPT | Performed by: EMERGENCY MEDICINE

## 2023-07-11 PROCEDURE — 80307 DRUG TEST PRSMV CHEM ANLYZR: CPT | Performed by: EMERGENCY MEDICINE

## 2023-07-11 PROCEDURE — 99285 EMERGENCY DEPT VISIT HI MDM: CPT | Performed by: EMERGENCY MEDICINE

## 2023-07-11 PROCEDURE — 99285 EMERGENCY DEPT VISIT HI MDM: CPT

## 2023-07-11 NOTE — ED NOTES
[place] PROM[place] indicates: Active. Recipient #[id number]   [address] [facility] managed by [facility] [place].

## 2023-07-11 NOTE — ED PROVIDER NOTES
History  Chief Complaint   Patient presents with   • Psychiatric Evaluation     Pt came in with ems with increased depression and vague SI. States her bf got arrested on Saturday and now she is homeless. Has a hx of cutting herself, states " I feel hopeless"       Patient is a 50-year-old female with a h/o depression and PTSD who presents at the request of her therapist for inpt admission. Worsening anxiety and depression over the last 4 days. Boyfriend was arrested for assaulting a family member. Now homeless. Lost seroquel that day. No drugs or etoh. No HI/hallucinations. Vague SI thoughts. No plan. H/o cutting only. Prior to Admission Medications   Prescriptions Last Dose Informant Patient Reported? Taking? FLUoxetine (PROzac) 20 mg capsule   No No   Sig: Take 1 capsule (20 mg total) by mouth daily Do not start before 2022. QUEtiapine (SEROquel) 100 mg tablet   No No   Sig: Take 1 tablet (100 mg total) by mouth every evening   cholecalciferol (VITAMIN D3) 1,000 units tablet   No No   Sig: Take 2 tablets (2,000 Units total) by mouth daily Do not start before 2022.    levonorgestrel (MIRENA) 20 MCG/24HR IUD   Yes No   Si each by Intrauterine route once   traZODone (DESYREL) 50 mg tablet   No No   Sig: Take 1 tablet (50 mg total) by mouth daily at bedtime      Facility-Administered Medications: None       Past Medical History:   Diagnosis Date   • ADHD    • Anemia     with pregnancy    • Anxiety    • Depression    • Gestational diabetes        • History of migraine headaches    • History of postpartum hemorrhage    • PTSD (post-traumatic stress disorder)        Past Surgical History:   Procedure Laterality Date   • NO PAST SURGERIES         Family History   Problem Relation Age of Onset   • Anxiety disorder Mother    • No Known Problems Father    • Anxiety disorder Sister    • Anxiety disorder Brother    • Other Daughter         non-ketotic hyperglycemia      I have reviewed and agree with the history as documented. E-Cigarette/Vaping   • E-Cigarette Use Current Every Day User    • Start Date 1/1/19      E-Cigarette/Vaping Substances   • Nicotine Yes    • THC No    • CBD No    • Flavoring Yes    • Other No    • Unknown No      Social History     Tobacco Use   • Smoking status: Never   • Smokeless tobacco: Never   Vaping Use   • Vaping Use: Every day   • Start date: 1/1/2019   • Substances: Nicotine, Flavoring   Substance Use Topics   • Alcohol use: Not Currently   • Drug use: No       Review of Systems   Constitutional: Negative. HENT: Negative. Eyes: Negative. Respiratory: Negative. Cardiovascular: Negative. Gastrointestinal: Negative. Endocrine: Negative. Genitourinary: Negative. Musculoskeletal: Negative. Skin: Negative. Allergic/Immunologic: Negative. Neurological: Negative. Hematological: Negative. Psychiatric/Behavioral: Positive for agitation, behavioral problems and dysphoric mood. All other systems reviewed and are negative. Physical Exam  Physical Exam  Vitals and nursing note reviewed. Constitutional:       Appearance: Normal appearance. She is obese. HENT:      Head: Normocephalic and atraumatic. Mouth/Throat:      Mouth: Mucous membranes are moist.      Pharynx: Oropharynx is clear. Eyes:      Conjunctiva/sclera: Conjunctivae normal.      Pupils: Pupils are equal, round, and reactive to light. Cardiovascular:      Rate and Rhythm: Normal rate and regular rhythm. Pulses: Normal pulses. Heart sounds: Normal heart sounds. Pulmonary:      Effort: Pulmonary effort is normal.      Breath sounds: Normal breath sounds. Musculoskeletal:         General: Normal range of motion. Cervical back: Normal range of motion and neck supple. Skin:     General: Skin is warm and dry. Capillary Refill: Capillary refill takes less than 2 seconds.    Neurological:      General: No focal deficit present. Mental Status: She is alert and oriented to person, place, and time. Psychiatric:         Attention and Perception: Attention normal.         Mood and Affect: Mood is depressed. Affect is flat. Speech: Speech is delayed. Behavior: Behavior is withdrawn. Thought Content: Thought content is not paranoid. Thought content includes suicidal ideation. Thought content does not include homicidal ideation. Thought content does not include suicidal plan. Cognition and Memory: Cognition and memory normal.         Judgment: Judgment normal.         Vital Signs  ED Triage Vitals [07/11/23 1502]   Temperature Pulse Respirations Blood Pressure SpO2   99.1 °F (37.3 °C) 104 16 140/94 97 %      Temp Source Heart Rate Source Patient Position - Orthostatic VS BP Location FiO2 (%)   Oral Monitor Sitting Right arm --      Pain Score       No Pain           Vitals:    07/11/23 1502 07/12/23 0826   BP: 140/94 100/59   Pulse: 104 65   Patient Position - Orthostatic VS: Sitting Lying         Visual Acuity      ED Medications  Medications - No data to display    Diagnostic Studies  Results Reviewed     Procedure Component Value Units Date/Time    Rapid drug screen, urine [[id number]]  (Normal) Collected: 07/11/23 1522    Lab Status: Final result Specimen: Urine, Clean Catch Updated: 07/11/23 1600     Amph/Meth UR Negative     Barbiturate Ur Negative     Benzodiazepine Urine Negative     Cocaine Urine Negative     Methadone Urine Negative     Opiate Urine Negative     PCP Ur Negative     THC Urine Negative     Oxycodone Urine Negative    Narrative:      FOR MEDICAL PURPOSES ONLY. IF CONFIRMATION NEEDED PLEASE CONTACT THE LAB WITHIN 5 DAYS.     Drug Screen Cutoff Levels:  AMPHETAMINE/METHAMPHETAMINES  1000 ng/mL  BARBITURATES     200 ng/mL  BENZODIAZEPINES     200 ng/mL  COCAINE      300 ng/mL  METHADONE      300 ng/mL  OPIATES      300 ng/mL  PHENCYCLIDINE     25 ng/mL  THC       50 ng/mL  OXYCODONE      100 ng/mL    POCT alcohol breath test [[id number]]  (Normal) Resulted: 07/11/23 1522    Lab Status: Final result Updated: 07/11/23 1522     EXTBreath Alcohol 0.000    POCT pregnancy, urine [[id number]]  (Normal) Resulted: 07/11/23 1522    Lab Status: Final result Updated: 07/11/23 1522     EXT Preg Test, Ur Negative     Control Valid                 No orders to display              Procedures  Procedures         ED Course                                             Medical Decision Making  Severe episode of recurrent major depressive disorder, without psychotic features ([address]): chronic illness or injury with exacerbation, progression, or side effects of treatment     Details: triggered by recent social issues. not on meds. medically cleared, 201 signed. Amount and/or Complexity of Data Reviewed  Labs: ordered. Decision-making details documented in ED Course. Risk  Decision regarding hospitalization. Disposition  Final diagnoses:   Severe episode of recurrent major depressive disorder, without psychotic features ([address])     Time reflects when diagnosis was documented in both MDM as applicable and the Disposition within this note     Time User Action Codes Description Comment    7/11/2023  3:17 PM Frantz Lopez [F33.2] Severe episode of recurrent major depressive disorder, without psychotic features [facility])       ED Disposition     ED Disposition   Transfer to [address] [place] [address]   --    Date/Time   Tue Jul 11, 2023  3:17 PM    Comment   Lashon Teran should be transferred out to [facility] and has been medically cleared.            MD Documentation    Anaya Busby Most Recent Value   Patient Condition The patient has been stabilized such that within reasonable medical probability, no material deterioration of the patient condition or the condition of the unborn child(brandy) is likely to result from the transfer, An emergency transfer is being made prior to stabilization due to the need for definitive care and the benefit of transfer outweighs the risk   Reason for Transfer Level of Care needed not available at this facility, No bed available at level of patient's needs   Benefits of Transfer Specialized equipment and/or services available at the receiving facility (Include comment)________________________   Risks of Transfer Potential for delay in receiving treatment   Accepting Physician Dr. Moy Daugherty Name, [facility] [address] [facility]   Transported by [employer] and Unit #) [facility]   Sending MD Dr. Ray Chna   Provider Certification General risk, such as traffic hazards, adverse weather conditions, rough terrain or turbulence, possible failure of equipment (including vehicle or aircraft), or consequences of actions of persons outside the control of the transport personnel      RN Documentation    Flowsheet Row Most [facility] Name, [facility] [address] [facility]   Medications Reviewed with Next Provider of Service Yes   Transport Mode Other (Comment)   Transported by [employer] and Unit #) [facility]   Copies of Medical Records Sent History and Physical, Orders, Progress note, Transfer form, EKG, Labs   Patient Belongings Disposition Sent with patient   Transfer Date 07/12/23      Follow-up Information    None         Discharge Medication List as of 7/12/2023  8:36 AM      CONTINUE these medications which have NOT CHANGED    Details   cholecalciferol (VITAMIN D3) 1,000 units tablet Take 2 tablets (2,000 Units total) by mouth daily Do not start before November 24, 2022., Starting Thu 11/24/2022, Normal      FLUoxetine (PROzac) 20 mg capsule Take 1 capsule (20 mg total) by mouth daily Do not start before November 24, 2022., Starting Thu 11/24/2022, Normal      levonorgestrel (MIRENA) 20 MCG/24HR IUD 1 each by Intrauterine route once, Historical Med      QUEtiapine (SEROquel) 100 mg tablet Take 1 tablet (100 mg total) by mouth every evening, Starting Wed 11/23/2022, Normal traZODone (DESYREL) 50 mg tablet Take 1 tablet (50 mg total) by mouth daily at bedtime, Starting Wed 11/23/2022, Normal             No discharge procedures on file.     PDMP Review       Value Time User    PDMP Reviewed  Yes 11/23/2022  8:35 AM Johny Espinoza MD          ED Provider  Electronically Signed by           Ramin Kaur MD  07/14/23 1003

## 2023-07-11 NOTE — LETTER
[facility] EMERGENCY DEPARTMENT  [address] [facility] [address]  [phone]  Dept: [address] TRANSFER CONSENT    NAME Arlet Escudero                                         1990                              MRN [id number]    I have been informed of my rights regarding examination, treatment, and transfer   by Dr. Eugenie Gonsales DO    Benefits: Specialized equipment and/or services available at the receiving facility (Include comment)________________________    Risks: Potential for delay in receiving treatment      Consent for Transfer:  I acknowledge that my medical condition has been evaluated and explained to me by the emergency department physician or other qualified medical person and/or my attending physician, who has recommended that I be transferred to the service of  Accepting Physician: Dr. Matt Jacobs at [address] [place] [address] [address] Name, [address] : [facility]. The above potential benefits of such transfer, the potential risks associated with such transfer, and the probable risks of not being transferred have been explained to me, and I fully understand them. The doctor has explained that, in my case, the benefits of transfer outweigh the risks. I agree to be transferred. I authorize the performance of emergency medical procedures and treatments upon me in both transit and upon arrival at the receiving facility. Additionally, I authorize the release of any and all medical records to the receiving facility and request they be transported with me, if possible. I understand that the safest mode of transportation during a medical emergency is an ambulance and that the Hospital advocates the use of this mode of transport. Risks of traveling to the receiving facility by car, including absence of medical control, life sustaining equipment, such as oxygen, and medical personnel has been explained to me and I fully understand them.     (ART CORRECT BOX BELOW)  [ x ]  I consent to the stated transfer and to be transported by ambulance/helicopter. [  ]  I consent to the stated transfer, but refuse transportation by ambulance and accept full responsibility for my transportation by car. I understand the risks of non-ambulance transfers and I exonerate the Hospital and its staff from any deterioration in my condition that results from this refusal.    X___________________________________________    DATE  23  TIME________  Signature of patient or legally responsible individual signing on patient behalf           RELATIONSHIP TO PATIENT_________________________                Provider Certification    NAME Letitia Goodell                                         1990                              MRN [id number]    A medical screening exam was performed on the above named patient. Based on the examination:    Condition Necessitating Transfer The encounter diagnosis was Severe episode of recurrent major depressive disorder, without psychotic features ([address]).     Patient Condition: The patient has been stabilized such that within reasonable medical probability, no material deterioration of the patient condition or the condition of the unborn child(brandy) is likely to result from the transfer, An emergency transfer is being made prior to stabilization due to the need for definitive care and the benefit of transfer outweighs the risk    Reason for Transfer: Level of Care needed not available at this facility, No bed available at level of patient's needs    Transfer Requirements: [address] available and qualified personnel available for treatment as acknowledged by    Agreed to accept transfer and to provide appropriate medical treatment as acknowledged by       Dr. Cedric Kan  Appropriate medical records of the examination and treatment of the patient are provided at the time of transfer   [address] [facility] [address] _______  Transfer will be performed by qualified personnel from [address]  and appropriate transfer equipment as required, including the use of necessary and appropriate life support measures. Provider Certification: I have examined the patient and explained the following risks and benefits of being transferred/refusing transfer to the patient/family:  General risk, such as traffic hazards, adverse weather conditions, rough terrain or turbulence, possible failure of equipment (including vehicle or aircraft), or consequences of actions of persons outside the control of the transport personnel      Based on these reasonable risks and benefits to the patient and/or the unborn child(brandy), and based upon the information available at the time of the patient’s examination, I certify that the medical benefits reasonably to be expected from the provision of appropriate medical treatments at another medical facility outweigh the increasing risks, if any, to the individual’s medical condition, and in the case of labor to the unborn child, from effecting the transfer.     X____________________________________________ DATE 07/12/23        TIME_______      ORIGINAL - SEND TO MEDICAL RECORDS   COPY - SEND WITH PATIENT DURING TRANSFER

## 2023-07-11 NOTE — LETTER
Section I - General Information    Name of Patient: Bruna Maker                 : 1990    Medicare #:____________________  Transport Date: 23 (PCS is valid for round trips on this date and for all repetitive trips in the 60-day range as noted below.)  Origin: [address]                                                         Destination:________________________________________________  Is the pt's stay covered under Medicare Part A (PPS/DRG)     (_) YES  (X_) NO  Closest appropriate facility? (_X) YES  (_) NO  If no, why is transport to more distant facility required?________________________  If hosp-hosp transfer, describe services needed at 2nd facility not available at 1st facility? _________________________________  If hospice pt, is this transport related to pt's terminal illness? (_) YES (_) NO Describe____________________________________    Section II - Medical Necessity Questionnaire  Ambulance transportation is medically necessary only if other means of transport are contraindicated or would be potentially harmful to the patient. To meet this requirement, the patient must either be "bed confined" or suffer from a condition such that transport by means other than ambulance is contraindicated by the patient's condition.  The following questions must be answered by the medical professional signing below for this form to be valid:    1)  Describe the MEDICAL CONDITION (physical and/or mental) of this patient  [address] that requires the patient to be transported in an ambulance and why transport by other means is contraindicated by the patient's condition:__________________________________________________________________________________________________    2) Is the patient "bed confined" as defined below?     (_) YES  (_X) NO  To be "be confined" the patient must satisfy all three of the following conditions: (1) unable to get up from bed without Assistance; AND (2) unable to ambulate; AND (3) unable to sit in a chair or wheelchair. 3) Can this patient safely be transported by car or wheelchair Stormyarchana Bowling (i.e., seated during transport without a medical attendant or monitoring)?   (_) YES  (_X) NO    4) In addition to completing questions 1-3 above, please check any of the following conditions that apply*:  *Note: supporting documentation for any boxes checked must be maintained in the patient's medical records  (_)Contractures   (_)Non-Healed Fractures  (_)Patient is confused (_)Patient is comatose (_)Moderate/severe pain on movement (_X)Danger to self/others  (_)IV meds/fluids required (_)Patient is combative(_)Need or possible need for restraints (_)DVT requires elevation of lower extremity  (_)Medical attendant required (_)Requires oxygen-unable to self administer (_)Special handling/isolation/infection control precautions required (_)Unable to tolerate seated position for time needed to transport (_)Hemodynamic monitoring required en route (_)Unable to sit in a chair or wheelchair due to decubitus ulcers or other wounds (_)Cardiac monitoring required en route (_)Morbid obesity requires additional personnel/equipment to safely handle patient (_)Orthopedic device (backboard, halo, pins, traction, brace, wedge, etc,) requiring special handling during transport (_)Other(specify)_______________________________________________    Section III - Signature of Physician or Healthcare Professional    I certify that the above information is accurate based on my evaluation of this patient, and that the medical necessity provisions of 42 .40(e)(1) are met, requiring that this patient be transported by ambulance. I understand this information will be used by the Centers for Medicare and Medicaid Services (CMS) to support the determination of medical necessity for ambulance services.  I represent that I am the beneficiary’s attending physician; or an employee of the beneficiary’s attending physician, or the hospital or facility where the beneficiary is being treated and from which the beneficiary is being transported; that I have personal knowledge of the beneficiary’s condition at the time of transport; and that I meet all Medicare regulations and applicable State licensure laws for the credential indicated. (_) If this box is checked, I also certify that the patient is physically or mentally incapable of signing the ambulance service's claim and that the institution with which I am affiliated has furnished care, services, or assistance to the patient. My signature below is made on behalf of the patient pursuant to 42 CFR §424.36(b)(4). In accordance with 42 CFR §424.37, the specific reason(s) that the patient is physically or mentally incapable of signing the claim form is as follows: _________________________________________________________________________________________________________      Signature of Physician* or Healthcare Professional______________________________________________________________  Signature Date 07/12/23 (For scheduled repetitive transports, this form is not valid for transports performed more than 60 days after this date)    Printed Name & Credentials of Physician or Healthcare Professional (MD, DO, RN, etc.)________________________________  *Form must be signed by patient's attending physician for scheduled, repetitive transports.  For non-repetitive, unscheduled ambulance transports, if unable to obtain the signature of the attending physician, any of the following may sign (choose appropriate option below)  (_) Physician Assistant   (_)  Clinical Nurse Specialist    (_)  Licensed Practical Nurse    (_)    (_)  Nurse Practitioner     (_)  Registered Nurse                (_)                         (_) Discharge Planner

## 2023-07-11 NOTE — ED NOTES
Pt requested to keep a book at her bedside. Primary RN aware. This tech searched book and placed at bedside.         Mana Marianela  07/11/23 1539

## 2023-07-11 NOTE — ED NOTES
Pt sleeping comfortably in mora bed with curtains closed and virtual sitter @ bedside. Non-labored resps and no signs of distress noted.  Q15 safety checks continue as well      Mohan KATHY Botello  07/11/23 7243

## 2023-07-12 VITALS
HEART RATE: 65 BPM | OXYGEN SATURATION: 100 % | DIASTOLIC BLOOD PRESSURE: 59 MMHG | RESPIRATION RATE: 18 BRPM | SYSTOLIC BLOOD PRESSURE: 100 MMHG | TEMPERATURE: 98.3 F

## 2023-07-12 NOTE — ED NOTES
Patient is accepted at [facility]. Patient is accepted by Dr. Cirilo Rizo per Yovani Phipps in admissions. Transportation is arranged with [facility]. Transportation is scheduled for TBD. Patient may go to the floor after 0800.     Cathy Cota  Crisis Intervention Specialist II  07/11/23

## 2023-07-12 NOTE — ED NOTES
Roundtrip initiated at this time. [facility] P/U 0830 7.12.23    Call placed to [id number] [facility] to [address] aware of transport time.     Omari Pleitez  Crisis Intervention Specialist II  07/11/23

## 2023-07-12 NOTE — ED NOTES
Patient presented to ED for worsening/ongoing panic attacks. Patient was living with her boyfriend. Patient reported on Saturday her boyfriend was arrested for assaulting his grandmother. Patient had to leave her home and is now homeless. Patient stated she feels hopeless and overwhelmed. She lost everything including housing and her job. The patient feels she has hit rock bottom in her life. The patient goes to [facility] for therapy/psychiatric. Patient reports no medication changes. Since patient moved out of her home, she only has access to Prozac. Patient stated her therapist through [facility] recommended she come to the ed for eval. Patient denied suicidal thoughts and homicidal thoughts. Patient reported passive suicidal thoughts to the doctor upon arrival to the ed. Patient denied hallucinations. Patient stated history of suicidal attempt, recent attempt was when she was 26years old. Patient stated no legal or drug/alcohol history. Patient is only sleeping for 1 hour a night and reported facing food insecurity. Discussed treatment options, patient does not feel safe being discharged. Patient requesting to sign a 201 for treatment.

## 2023-08-30 ENCOUNTER — HOSPITAL ENCOUNTER (EMERGENCY)
Facility: HOSPITAL | Age: 33
Discharge: HOME | End: 2023-08-30
Attending: EMERGENCY MEDICINE
Payer: COMMERCIAL

## 2023-08-30 ENCOUNTER — APPOINTMENT (EMERGENCY)
Dept: RADIOLOGY | Facility: HOSPITAL | Age: 33
End: 2023-08-30
Payer: COMMERCIAL

## 2023-08-30 VITALS
RESPIRATION RATE: 16 BRPM | OXYGEN SATURATION: 100 % | HEART RATE: 99 BPM | BODY MASS INDEX: 34.88 KG/M2 | SYSTOLIC BLOOD PRESSURE: 118 MMHG | WEIGHT: 178.57 LBS | DIASTOLIC BLOOD PRESSURE: 67 MMHG | TEMPERATURE: 99.1 F

## 2023-08-30 DIAGNOSIS — F14.10 COCAINE ABUSE (HCC): Primary | ICD-10-CM

## 2023-08-30 LAB
ALBUMIN SERPL BCP-MCNC: 4.6 G/DL (ref 3.5–5)
ALP SERPL-CCNC: 50 U/L (ref 34–104)
ALT SERPL W P-5'-P-CCNC: 41 U/L (ref 7–52)
AMPHETAMINES SERPL QL SCN: NEGATIVE
ANION GAP SERPL CALCULATED.3IONS-SCNC: 15 MMOL/L
APAP SERPL-MCNC: <10 UG/ML (ref 10–20)
AST SERPL W P-5'-P-CCNC: 28 U/L (ref 13–39)
ATRIAL RATE: 138 BPM
BARBITURATES UR QL: NEGATIVE
BASOPHILS # BLD AUTO: 0.05 THOUSANDS/ÂΜL (ref 0–0.1)
BASOPHILS NFR BLD AUTO: 0 % (ref 0–1)
BENZODIAZ UR QL: NEGATIVE
BILIRUB SERPL-MCNC: 0.68 MG/DL (ref 0.2–1)
BUN SERPL-MCNC: 10 MG/DL (ref 5–25)
CALCIUM SERPL-MCNC: 9.9 MG/DL (ref 8.4–10.2)
CARDIAC TROPONIN I PNL SERPL HS: 3 NG/L
CHLORIDE SERPL-SCNC: 101 MMOL/L (ref 96–108)
CO2 SERPL-SCNC: 18 MMOL/L (ref 21–32)
COCAINE UR QL: POSITIVE
CREAT SERPL-MCNC: 0.85 MG/DL (ref 0.6–1.3)
EOSINOPHIL # BLD AUTO: 0.03 THOUSAND/ÂΜL (ref 0–0.61)
EOSINOPHIL NFR BLD AUTO: 0 % (ref 0–6)
ERYTHROCYTE [DISTWIDTH] IN BLOOD BY AUTOMATED COUNT: 12.1 % (ref 11.6–15.1)
ETHANOL SERPL-MCNC: <10 MG/DL
GFR SERPL CREATININE-BSD FRML MDRD: 91 ML/MIN/1.73SQ M
GLUCOSE SERPL-MCNC: 130 MG/DL (ref 65–140)
HCT VFR BLD AUTO: 41.8 % (ref 34.8–46.1)
HGB BLD-MCNC: 14.4 G/DL (ref 11.5–15.4)
IMM GRANULOCYTES # BLD AUTO: 0.06 THOUSAND/UL (ref 0–0.2)
IMM GRANULOCYTES NFR BLD AUTO: 0 % (ref 0–2)
LYMPHOCYTES # BLD AUTO: 2.88 THOUSANDS/ÂΜL (ref 0.6–4.47)
LYMPHOCYTES NFR BLD AUTO: 17 % (ref 14–44)
MAGNESIUM SERPL-MCNC: 1.6 MG/DL (ref 1.9–2.7)
MCH RBC QN AUTO: 29.6 PG (ref 26.8–34.3)
MCHC RBC AUTO-ENTMCNC: 34.4 G/DL (ref 31.4–37.4)
MCV RBC AUTO: 86 FL (ref 82–98)
METHADONE UR QL: NEGATIVE
MONOCYTES # BLD AUTO: 0.56 THOUSAND/ÂΜL (ref 0.17–1.22)
MONOCYTES NFR BLD AUTO: 3 % (ref 4–12)
NEUTROPHILS # BLD AUTO: 13.71 THOUSANDS/ÂΜL (ref 1.85–7.62)
NEUTS SEG NFR BLD AUTO: 80 % (ref 43–75)
NRBC BLD AUTO-RTO: 0 /100 WBCS
OPIATES UR QL SCN: NEGATIVE
OXYCODONE+OXYMORPHONE UR QL SCN: NEGATIVE
P AXIS: 38 DEGREES
PCP UR QL: NEGATIVE
PLATELET # BLD AUTO: 361 THOUSANDS/UL (ref 149–390)
PMV BLD AUTO: 10.8 FL (ref 8.9–12.7)
POTASSIUM SERPL-SCNC: 4.1 MMOL/L (ref 3.5–5.3)
PR INTERVAL: 126 MS
PROT SERPL-MCNC: 7.6 G/DL (ref 6.4–8.4)
QRS AXIS: 73 DEGREES
QRSD INTERVAL: 72 MS
QT INTERVAL: 308 MS
QTC INTERVAL: 466 MS
RBC # BLD AUTO: 4.87 MILLION/UL (ref 3.81–5.12)
SALICYLATES SERPL-MCNC: <5 MG/DL (ref 3–20)
SODIUM SERPL-SCNC: 134 MMOL/L (ref 135–147)
T WAVE AXIS: 51 DEGREES
THC UR QL: NEGATIVE
VENTRICULAR RATE: 138 BPM
WBC # BLD AUTO: 17.29 THOUSAND/UL (ref 4.31–10.16)

## 2023-08-30 PROCEDURE — 82077 ASSAY SPEC XCP UR&BREATH IA: CPT

## 2023-08-30 PROCEDURE — 96365 THER/PROPH/DIAG IV INF INIT: CPT

## 2023-08-30 PROCEDURE — 99284 EMERGENCY DEPT VISIT MOD MDM: CPT

## 2023-08-30 PROCEDURE — 93005 ELECTROCARDIOGRAM TRACING: CPT

## 2023-08-30 PROCEDURE — 93010 ELECTROCARDIOGRAM REPORT: CPT | Performed by: INTERNAL MEDICINE

## 2023-08-30 PROCEDURE — 71045 X-RAY EXAM CHEST 1 VIEW: CPT

## 2023-08-30 PROCEDURE — 80307 DRUG TEST PRSMV CHEM ANLYZR: CPT

## 2023-08-30 PROCEDURE — 96361 HYDRATE IV INFUSION ADD-ON: CPT

## 2023-08-30 PROCEDURE — 80053 COMPREHEN METABOLIC PANEL: CPT

## 2023-08-30 PROCEDURE — 84484 ASSAY OF TROPONIN QUANT: CPT

## 2023-08-30 PROCEDURE — 85025 COMPLETE CBC W/AUTO DIFF WBC: CPT

## 2023-08-30 PROCEDURE — 36415 COLL VENOUS BLD VENIPUNCTURE: CPT

## 2023-08-30 PROCEDURE — 83735 ASSAY OF MAGNESIUM: CPT

## 2023-08-30 PROCEDURE — 80143 DRUG ASSAY ACETAMINOPHEN: CPT

## 2023-08-30 PROCEDURE — 96375 TX/PRO/DX INJ NEW DRUG ADDON: CPT

## 2023-08-30 PROCEDURE — 99285 EMERGENCY DEPT VISIT HI MDM: CPT | Performed by: EMERGENCY MEDICINE

## 2023-08-30 PROCEDURE — 80179 DRUG ASSAY SALICYLATE: CPT

## 2023-08-30 RX ORDER — MAGNESIUM SULFATE 1 G/100ML
1 INJECTION INTRAVENOUS ONCE
Status: COMPLETED | OUTPATIENT
Start: 2023-08-30 | End: 2023-08-30

## 2023-08-30 RX ORDER — LORAZEPAM 2 MG/ML
2 INJECTION INTRAMUSCULAR ONCE
Status: COMPLETED | OUTPATIENT
Start: 2023-08-30 | End: 2023-08-30

## 2023-08-30 RX ORDER — SODIUM CHLORIDE 9 MG/ML
3 INJECTION INTRAVENOUS
Status: DISCONTINUED | OUTPATIENT
Start: 2023-08-30 | End: 2023-08-30 | Stop reason: HOSPADM

## 2023-08-30 RX ADMIN — SODIUM CHLORIDE 1000 ML: 0.9 INJECTION, SOLUTION INTRAVENOUS at 05:15

## 2023-08-30 RX ADMIN — LORAZEPAM 2 MG: 2 INJECTION INTRAMUSCULAR; INTRAVENOUS at 03:30

## 2023-08-30 RX ADMIN — MAGNESIUM SULFATE 1 G: 1 INJECTION INTRAVENOUS at 04:25

## 2023-08-30 RX ADMIN — SODIUM CHLORIDE 1000 ML: 0.9 INJECTION, SOLUTION INTRAVENOUS at 03:27

## 2023-08-30 NOTE — ED PROVIDER NOTES
History  Chief Complaint   Patient presents with   • Overdose - Accidental     Reports doing several bundles of cocaine and drinking some alcohol tonight. Denies suicidal ideation. (Adds that her chest hurts and that she feels her arms and legs are going numb.)     Patient is a 21-year-old female coming in by way of [employer], after calling them, stating that her chest hurts, her arms and legs going numb, after doing "a lot" of cocaine. Patient also reports drinking. Overdose - Accidental  Ingested substance:  Illicit drugs and alcohol  Illicit drug type:  Cocaine  Context: intentional ingestion    Associated symptoms: chest pain    Associated symptoms: no abdominal pain, no altered mental status, no cough, no nausea, no shortness of breath, no slurred speech and no vomiting        Prior to Admission Medications   Prescriptions Last Dose Informant Patient Reported? Taking? FLUoxetine (PROzac) 20 mg capsule   No No   Sig: Take 1 capsule (20 mg total) by mouth daily Do not start before 2022. QUEtiapine (SEROquel) 100 mg tablet   No No   Sig: Take 1 tablet (100 mg total) by mouth every evening   cholecalciferol (VITAMIN D3) 1,000 units tablet   No No   Sig: Take 2 tablets (2,000 Units total) by mouth daily Do not start before 2022.    levonorgestrel (MIRENA) 20 MCG/24HR IUD   Yes No   Si each by Intrauterine route once   traZODone (DESYREL) 50 mg tablet   No No   Sig: Take 1 tablet (50 mg total) by mouth daily at bedtime      Facility-Administered Medications: None       Past Medical History:   Diagnosis Date   • ADHD    • Anemia     with pregnancy    • Anxiety    • Depression    • Gestational diabetes        • History of migraine headaches    • History of postpartum hemorrhage    • PTSD (post-traumatic stress disorder)        Past Surgical History:   Procedure Laterality Date   • NO PAST SURGERIES         Family History   Problem Relation Age of Onset   • Anxiety disorder Mother • No Known Problems Father    • Anxiety disorder Sister    • Anxiety disorder Brother    • Other Daughter         non-ketotic hyperglycemia      I have reviewed and agree with the history as documented. E-Cigarette/Vaping   • E-Cigarette Use Current Every Day User    • Start Date 1/1/19      E-Cigarette/Vaping Substances   • Nicotine Yes    • THC No    • CBD No    • Flavoring Yes    • Other No    • Unknown No      Social History     Tobacco Use   • Smoking status: Never   • Smokeless tobacco: Never   Vaping Use   • Vaping Use: Every day   • Start date: 1/1/2019   • Substances: Nicotine, Flavoring   Substance Use Topics   • Alcohol use: Yes     Comment: occasional   • Drug use: Yes     Types: Cocaine       Review of Systems   Constitutional: Negative for chills, fatigue and fever. Respiratory: Negative for cough, chest tightness and shortness of breath. Cardiovascular: Positive for chest pain and palpitations. Gastrointestinal: Negative for abdominal pain, nausea and vomiting. Physical Exam  Physical Exam  Vitals reviewed. Constitutional:       Appearance: Normal appearance. She is normal weight. HENT:      Head: Normocephalic and atraumatic. Right Ear: External ear normal.      Left Ear: External ear normal.      Nose: Nose normal.   Eyes:      Conjunctiva/sclera: Conjunctivae normal.   Cardiovascular:      Rate and Rhythm: Regular rhythm. Tachycardia present. Pulmonary:      Effort: Pulmonary effort is normal.   Musculoskeletal:         General: Normal range of motion. Cervical back: Normal range of motion. Skin:     General: Skin is warm and dry. Neurological:      Mental Status: She is alert.          Vital Signs  ED Triage Vitals   Temperature Pulse Respirations Blood Pressure SpO2   08/30/23 0303 08/30/23 0303 08/30/23 0303 08/30/23 0304 08/30/23 0304   99.1 °F (37.3 °C) (!) 150 18 143/81 100 %      Temp Source Heart Rate Source Patient Position - Orthostatic VS BP Location FiO2 (%)   08/30/23 0303 08/30/23 0303 08/30/23 0304 08/30/23 0304 --   Oral Monitor Lying Left arm       Pain Score       --                  Vitals:    08/30/23 0303 08/30/23 0304 08/30/23 0358 08/30/23 0602   BP:  143/81 117/82 118/67   Pulse: (!) 150  (!) 115 99   Patient Position - Orthostatic VS:  Lying Sitting Lying         Visual Acuity      ED Medications  Medications   sodium chloride (PF) 0.9 % injection 3 mL (has no administration in time range)   LORazepam (ATIVAN) injection 2 mg (2 mg Intravenous Given 8/30/23 0330)   sodium chloride 0.9 % bolus 1,000 mL (0 mL Intravenous Stopped 8/30/23 0527)   sodium chloride 0.9 % bolus 1,000 mL (0 mL Intravenous Stopped 8/30/23 0602)   magnesium sulfate IVPB (premix) SOLN 1 g (0 g Intravenous Stopped 8/30/23 0527)       Diagnostic Studies  Results Reviewed     Procedure Component Value Units Date/Time    Rapid drug screen, urine [[id number]]  (Abnormal) Collected: 08/30/23 0358    Lab Status: Final result Specimen: Urine, Clean Catch Updated: 08/30/23 0420     Amph/Meth UR Negative     Barbiturate Ur Negative     Benzodiazepine Urine Negative     Cocaine Urine Positive     Methadone Urine Negative     Opiate Urine Negative     PCP Ur Negative     THC Urine Negative     Oxycodone Urine Negative    Narrative:      Presumptive report. If requested, specimen will be sent to reference lab for confirmation. FOR MEDICAL PURPOSES ONLY. IF CONFIRMATION NEEDED PLEASE CONTACT THE LAB WITHIN 5 DAYS.     Drug Screen Cutoff Levels:  AMPHETAMINE/METHAMPHETAMINES  1000 ng/mL  BARBITURATES     200 ng/mL  BENZODIAZEPINES     200 ng/mL  COCAINE      300 ng/mL  METHADONE      300 ng/mL  OPIATES      300 ng/mL  PHENCYCLIDINE     25 ng/mL  THC       50 ng/mL  OXYCODONE      100 ng/mL    Comprehensive metabolic panel [[id number]]  (Abnormal) Collected: 08/30/23 0327    Lab Status: Final result Specimen: Blood from Line, Venous Updated: 08/30/23 0406     Sodium 134 mmol/L      Potassium 4.1 mmol/L      Chloride 101 mmol/L      CO2 18 mmol/L      ANION GAP 15 mmol/L      BUN 10 mg/dL      Creatinine 0.85 mg/dL      Glucose 130 mg/dL      Calcium 9.9 mg/dL      AST 28 U/L      ALT 41 U/L      Alkaline Phosphatase 50 U/L      Total Protein 7.6 g/dL      Albumin 4.6 g/dL      Total Bilirubin 0.68 mg/dL      eGFR 91 ml/min/1.73sq m     Narrative:      [place] guidelines for Chronic Kidney Disease (CKD):   •  Stage 1 with normal or high GFR (GFR > 90 mL/min/1.73 square meters)  •  Stage 2 Mild CKD (GFR = 60-89 mL/min/1.73 square meters)  •  Stage 3A Moderate CKD (GFR = 45-59 mL/min/1.73 square meters)  •  Stage 3B Moderate CKD (GFR = 30-44 mL/min/1.73 square meters)  •  Stage 4 Severe CKD (GFR = 15-29 mL/min/1.73 square meters)  •  Stage 5 End Stage CKD (GFR <15 mL/min/1.73 square meters)  Note: GFR calculation is accurate only with a steady state creatinine    Magnesium [[id number]]  (Abnormal) Collected: 08/30/23 0327    Lab Status: Final result Specimen: Blood from Line, Venous Updated: 08/30/23 0406     Magnesium 1.6 mg/dL     Salicylate level [[id number]]  (Normal) Collected: 08/30/23 0327    Lab Status: Final result Specimen: Blood from Line, Venous Updated: 03/93/04 0510     Salicylate Lvl <5 mg/dL     Acetaminophen level-If concentration is detectable, please discuss with medical  on call.  [[id number]]  (Abnormal) Collected: 08/30/23 0327    Lab Status: Final result Specimen: Blood from Line, Venous Updated: 08/30/23 0406     Acetaminophen Level <10 ug/mL     HS Troponin 0hr (reflex protocol) [[id number]]  (Normal) Collected: 08/30/23 0327    Lab Status: Final result Specimen: Blood from Line, Venous Updated: 08/30/23 0403     hs TnI 0hr 3 ng/L     Ethanol [[id number]]  (Normal) Collected: 08/30/23 0327    Lab Status: Final result Specimen: Blood from Line, Venous Updated: 08/30/23 0358     Ethanol Lvl <10 mg/dL     CBC and differential [[id number]]  (Abnormal) Collected: 08/30/23 0327    Lab Status: Final result Specimen: Blood from Line, Venous Updated: 08/30/23 0339     WBC 17.29 Thousand/uL      RBC 4.87 Million/uL      Hemoglobin 14.4 g/dL      Hematocrit 41.8 %      MCV 86 fL      MCH 29.6 pg      MCHC 34.4 g/dL      RDW 12.1 %      MPV 10.8 fL      Platelets 455 Thousands/uL      nRBC 0 /100 WBCs      Neutrophils Relative 80 %      Immat GRANS % 0 %      Lymphocytes Relative 17 %      Monocytes Relative 3 %      Eosinophils Relative 0 %      Basophils Relative 0 %      Neutrophils Absolute 13.71 Thousands/µL      Immature Grans Absolute 0.06 Thousand/uL      Lymphocytes Absolute 2.88 Thousands/µL      Monocytes Absolute 0.56 Thousand/µL      Eosinophils Absolute 0.03 Thousand/µL      Basophils Absolute 0.05 Thousands/µL                  X-ray chest 1 view portable   ED Interpretation by Sherman Deng PA-C (48/66 [id number])   No acute cardiopulmonary disease        Final Result by Eve Soares MD (08/30 [id number])      No acute cardiopulmonary disease. Workstation performed: [id number]                    Procedures  Procedures         ED Course  ED Course as of 08/30/23 0610   Wed Aug 30, 2023   0405 hs TnI 0hr: 3   0411 Anion Gap: 15   0411 CO2(!): 18   0427 COCAINE URINE(!): Positive   0427 Starting to feel better. No interest in rehab   2312 X-ray chest 1 view portable     No acute cardiopulmonary disease.                HEART Risk Score    Flowsheet Row Most Recent Value   Heart Score Risk Calculator    History 0 Filed at: 08/30/2023 0406   ECG 0 Filed at: 08/30/2023 0406   Age 0 Filed at: 08/30/2023 0406   Risk Factors 1 Filed at: 08/30/2023 0406   Troponin 0 Filed at: 08/30/2023 0406   HEART Score 1 Filed at: 08/30/2023 0406                        SBIRT 20yo+    Flowsheet Row Most Recent Value   Initial Alcohol Screen: US AUDIT-C     1. How often do you have a drink containing alcohol? 3 Filed at: 08/30/2023 0307   2.  How many drinks containing alcohol do you have on a typical day you are drinking? 2 Filed at: 08/30/2023 0307   3a. Male UNDER 65: How often do you have five or more drinks on one occasion? 0 Filed at: 08/30/2023 0307   3b. FEMALE Any Age, or MALE 65+: How often do you have 4 or more drinks on one occassion? 0 Filed at: 08/30/2023 0226   Audit-C Score 5 Filed at: 08/30/2023 8744   ROSANNA: How many times in the past year have you. .. Used an illegal drug or used a prescription medication for non-medical reasons? Weekly Filed at: 08/30/2023 0307   DAST-10: In the past 12 months. ..    1. Have you used drugs other than those required for medical reasons? 1 Filed at: 08/30/2023 0307   2. Do you use more than one drug at a time? 0 Filed at: 08/30/2023 0307   3. Have you had medical problems as a result of your drug use (e.g., memory loss, hepatitis, convulsions, bleeding, etc.)? 0 Filed at: 08/30/2023 0307   4. Have you had "blackouts" or "flashbacks" as a result of drug use? YesNo 0 Filed at: 08/30/2023 0307   5. Do you ever feel bad or guilty about your drug use? 1 Filed at: 08/30/2023 0307   6. Does your spouse (or parent) ever complain about your involvement with drugs? 0 Filed at: 08/30/2023 0307   7. Have you neglected your family because of your use of drugs? 0 Filed at: 08/30/2023 0307   8. Have you engaged in illegal activities in order to obtain drugs? 0 Filed at: 08/30/2023 0307   9. Have you ever experienced withdrawal symptoms (felt sick) when you stopped taking drugs? 0 Filed at: 08/30/2023 0307   10. Are you always able to stop using drugs when you want to? 0 Filed at: 08/30/2023 0307   DAST-10 Score 2 Filed at: 08/30/2023 9154                    Medical Decision Making  Patient is a 36-year-old female coming in for evaluation of palpitations, and chest pain after insufflating a large quantity of cocaine, which she was unable to fully quantify.   Patient's labs are within normal limits, no sign of endorgan damage or cardiac ischemia from her illicit drug use. Patient did feel better, and heart rate did come down after fluid, repletion of magnesium, as well as Ativan. Patient was discharged home, does not want detox at this time    Amount and/or Complexity of Data Reviewed  Labs: ordered. Decision-making details documented in ED Course. Radiology: ordered and independent interpretation performed. Decision-making details documented in ED Course. Risk  Prescription drug management. Disposition  Final diagnoses:   Cocaine abuse ([address])     Time reflects when diagnosis was documented in both MDM as applicable and the Disposition within this note     Time User Action Codes Description Comment    8/30/2023  5:44 AM Azael Prado Add [F14.10] Cocaine abuse [facility])       ED Disposition     ED Disposition   Discharge    Condition   Stable    Date/Time   Wed Aug 30, 2023  5:44 AM    Comment   Tano Mccarthy discharge to home/self care.                Follow-up Information     Follow up With Specialties Details Why Contact Info Additional [address] [facility] Emergency Medicine  As needed, If symptoms worsen [address] [phone]  [facility] Emergency Department          Discharge Medication List as of 8/30/2023  5:44 AM      CONTINUE these medications which have NOT CHANGED    Details   cholecalciferol (VITAMIN D3) 1,000 units tablet Take 2 tablets (2,000 Units total) by mouth daily Do not start before November 24, 2022., Starting Thu 11/24/2022, Normal      FLUoxetine (PROzac) 20 mg capsule Take 1 capsule (20 mg total) by mouth daily Do not start before November 24, 2022., Starting Thu 11/24/2022, Normal      levonorgestrel (MIRENA) 20 MCG/24HR IUD 1 each by Intrauterine route once, Historical Med      QUEtiapine (SEROquel) 100 mg tablet Take 1 tablet (100 mg total) by mouth every evening, Starting Wed 11/23/2022, Normal      traZODone (DESYREL) 50 mg tablet Take 1 tablet (50 mg total) by mouth daily at bedtime, Starting Wed 11/23/2022, Normal             No discharge procedures on file.     PDMP Review       Value Time User    PDMP Reviewed  Yes 11/23/2022  8:35 AM Karson Bond MD          ED Provider  Electronically Signed by           Minesh Garcia PA-C  08/30/23 1165

## 2023-08-30 NOTE — Clinical Note
Kingston Ng was seen and treated in our emergency department on 8/30/2023. No restrictions            Diagnosis:     Gissel  . She may return on this date: 08/31/2023         If you have any questions or concerns, please don't hesitate to call.       Krystal Navarro PA-C    ______________________________           _______________          _______________  Hospital Representative                              Date                                Time

## 2023-10-27 ENCOUNTER — HOSPITAL ENCOUNTER (EMERGENCY)
Facility: HOSPITAL | Age: 33
Discharge: HOME | End: 2023-10-27
Attending: EMERGENCY MEDICINE | Admitting: EMERGENCY MEDICINE
Payer: COMMERCIAL

## 2023-10-27 VITALS
SYSTOLIC BLOOD PRESSURE: 109 MMHG | HEART RATE: 111 BPM | OXYGEN SATURATION: 98 % | DIASTOLIC BLOOD PRESSURE: 90 MMHG | WEIGHT: 170.64 LBS | RESPIRATION RATE: 20 BRPM | BODY MASS INDEX: 33.33 KG/M2 | TEMPERATURE: 98.2 F

## 2023-10-27 DIAGNOSIS — N30.91 HEMORRHAGIC CYSTITIS: ICD-10-CM

## 2023-10-27 DIAGNOSIS — N39.0 UTI (URINARY TRACT INFECTION): Primary | ICD-10-CM

## 2023-10-27 LAB
BACTERIA UR QL AUTO: ABNORMAL /HPF
BILIRUB UR QL STRIP: NEGATIVE
CLARITY UR: CLEAR
COLOR UR: ABNORMAL
GLUCOSE UR STRIP-MCNC: NEGATIVE MG/DL
HGB UR QL STRIP.AUTO: 250
KETONES UR STRIP-MCNC: NEGATIVE MG/DL
LEUKOCYTE ESTERASE UR QL STRIP: 500
NITRITE UR QL STRIP: NEGATIVE
NON-SQ EPI CELLS URNS QL MICRO: ABNORMAL /HPF
PH UR STRIP.AUTO: 6.5 [PH]
PROT UR STRIP-MCNC: NEGATIVE MG/DL
RBC #/AREA URNS AUTO: ABNORMAL /HPF
SP GR UR STRIP.AUTO: 1.01 (ref 1–1.04)
UROBILINOGEN UA: NEGATIVE MG/DL
WBC #/AREA URNS AUTO: ABNORMAL /HPF

## 2023-10-27 PROCEDURE — 87086 URINE CULTURE/COLONY COUNT: CPT

## 2023-10-27 PROCEDURE — 81003 URINALYSIS AUTO W/O SCOPE: CPT

## 2023-10-27 PROCEDURE — 99284 EMERGENCY DEPT VISIT MOD MDM: CPT

## 2023-10-27 PROCEDURE — 99283 EMERGENCY DEPT VISIT LOW MDM: CPT

## 2023-10-27 PROCEDURE — 81001 URINALYSIS AUTO W/SCOPE: CPT

## 2023-10-27 RX ORDER — CEPHALEXIN 500 MG/1
500 CAPSULE ORAL EVERY 12 HOURS SCHEDULED
Qty: 14 CAPSULE | Refills: 0 | Status: SHIPPED | OUTPATIENT
Start: 2023-10-27 | End: 2023-11-03

## 2023-10-27 NOTE — ED PROVIDER NOTES
History  Chief Complaint   Patient presents with    Possible UTI     Pt arrives c/o UTI for 2 weeks  " burning, pain, frequency "     33year old female presenting today with urinary  symptoms, dysuria/frequency/urgency/blood in the urine. Not currently menstruating. No flank pain. No fever. No chills. Denies any flank pain. Mild suprapubic pain. No diarrhea, constipation, chest pain, shortness of breath. Denies any chance that she may be pregnant. Denies any contacts or concerns for STI/STD. Prior to Admission Medications   Prescriptions Last Dose Informant Patient Reported? Taking? FLUoxetine (PROzac) 20 mg capsule   No No   Sig: Take 1 capsule (20 mg total) by mouth daily Do not start before 2022. QUEtiapine (SEROquel) 100 mg tablet   No No   Sig: Take 1 tablet (100 mg total) by mouth every evening   cholecalciferol (VITAMIN D3) 1,000 units tablet   No No   Sig: Take 2 tablets (2,000 Units total) by mouth daily Do not start before 2022. levonorgestrel (MIRENA) 20 MCG/24HR IUD   Yes No   Si each by Intrauterine route once   traZODone (DESYREL) 50 mg tablet   No No   Sig: Take 1 tablet (50 mg total) by mouth daily at bedtime      Facility-Administered Medications: None       Past Medical History:   Diagnosis Date    ADHD     Anemia     with pregnancy     Anxiety     Depression     Gestational diabetes     2017    History of migraine headaches     History of postpartum hemorrhage     PTSD (post-traumatic stress disorder)        Past Surgical History:   Procedure Laterality Date    NO PAST SURGERIES         Family History   Problem Relation Age of Onset    Anxiety disorder Mother     No Known Problems Father     Anxiety disorder Sister     Anxiety disorder Brother     Other Daughter         non-ketotic hyperglycemia      I have reviewed and agree with the history as documented.     E-Cigarette/Vaping    E-Cigarette Use Current Every Day User     Start Date 19 E-Cigarette/Vaping Substances    Nicotine Yes     THC No     CBD No     Flavoring Yes     Other No     Unknown No      Social History     Tobacco Use    Smoking status: Every Day     Types: Cigarettes    Smokeless tobacco: Never   Vaping Use    Vaping Use: Every day    Start date: 1/1/2019    Substances: Nicotine, Flavoring   Substance Use Topics    Alcohol use: Not Currently     Comment: occasional    Drug use: Not Currently     Types: Cocaine       Review of Systems   Constitutional:  Negative for chills and fever. HENT:  Negative for ear pain and sore throat. Eyes:  Negative for pain and visual disturbance. Respiratory:  Negative for cough and shortness of breath. Cardiovascular:  Negative for chest pain and palpitations. Gastrointestinal:  Negative for abdominal pain, constipation, diarrhea, nausea and vomiting. Genitourinary:  Positive for dysuria, frequency, hematuria and urgency. Negative for difficulty urinating, flank pain, genital sores, menstrual problem, pelvic pain, vaginal bleeding, vaginal discharge and vaginal pain. Musculoskeletal:  Negative for arthralgias and back pain. Skin:  Negative for color change and rash. Neurological:  Negative for seizures and syncope. All other systems reviewed and are negative. Physical Exam  Physical Exam  Vitals and nursing note reviewed. Constitutional:       General: She is not in acute distress. Appearance: She is well-developed. HENT:      Head: Normocephalic and atraumatic. Eyes:      Conjunctiva/sclera: Conjunctivae normal.   Cardiovascular:      Rate and Rhythm: Normal rate and regular rhythm. Pulses: Normal pulses. Heart sounds: Normal heart sounds. No murmur heard. No friction rub. No gallop. Pulmonary:      Effort: Pulmonary effort is normal. No respiratory distress. Breath sounds: Normal breath sounds. Abdominal:      Palpations: Abdomen is soft. Tenderness:  There is no abdominal tenderness. Musculoskeletal:         General: No swelling. Cervical back: Neck supple. Skin:     General: Skin is warm and dry. Capillary Refill: Capillary refill takes less than 2 seconds. Coloration: Skin is not jaundiced or pale. Findings: No bruising, erythema, lesion or rash. Neurological:      Mental Status: She is alert. Psychiatric:         Mood and Affect: Mood normal.         Vital Signs  ED Triage Vitals [10/27/23 1551]   Temperature Pulse Respirations Blood Pressure SpO2   98.2 °F (36.8 °C) (!) 111 20 109/90 98 %      Temp Source Heart Rate Source Patient Position - Orthostatic VS BP Location FiO2 (%)   Tympanic Monitor Sitting Left arm --      Pain Score       --           Vitals:    10/27/23 1551   BP: 109/90   Pulse: (!) 111   Patient Position - Orthostatic VS: Sitting         Visual Acuity      ED Medications  Medications - No data to display    Diagnostic Studies  Results Reviewed       Procedure Component Value Units Date/Time    Urine Microscopic [[id number]]  (Abnormal) Collected: 10/27/23 1557    Lab Status: Final result Specimen: Urine, Clean Catch Updated: 10/27/23 1613     RBC, UA 20-30 /hpf      WBC, UA 30-50 /hpf      Epithelial Cells Occasional /hpf      Bacteria, UA None Seen /hpf     Urine culture [[id number]] Collected: 10/27/23 1557    Lab Status:  In process Specimen: Urine, Clean Catch Updated: 10/27/23 1613    UA w Reflex to Microscopic w Reflex to Culture [[id number]]  (Abnormal) Collected: 10/27/23 1557    Lab Status: Final result Specimen: Urine, Clean Catch Updated: 10/27/23 1607     Color, UA Straw     Clarity, UA Clear     Specific Gravity, UA 1.010     pH, UA 6.5     Leukocytes, .0     Nitrite, UA Negative     Protein, UA Negative mg/dl      Glucose, UA Negative mg/dl      Ketones, UA Negative mg/dl      Bilirubin, UA Negative     Occult Blood, .0     UROBILINOGEN UA Negative mg/dL                    No orders to display Procedures  Procedures         ED Course  ED Course as of 10/27/23 1930   Fri Oct 27, 2023   1609 Nitrite, UA: Negative   1615 Bacteria, UA: None Seen   1615 RBC, UA(!): 20-30   1615 WBC, UA(!): 30-50                               SBIRT 22yo+      Flowsheet Row Most Recent Value   Initial Alcohol Screen: US AUDIT-C     1. How often do you have a drink containing alcohol? 0 Filed at: 10/27/2023 1553   2. How many drinks containing alcohol do you have on a typical day you are drinking? 0 Filed at: 10/27/2023 1553   3a. Male UNDER 65: How often do you have five or more drinks on one occasion? 0 Filed at: 10/27/2023 1553   3b. FEMALE Any Age, or MALE 65+: How often do you have 4 or more drinks on one occassion? 0 Filed at: 10/27/2023 1553   Audit-C Score 0 Filed at: 10/27/2023 1553   ROSANNA: How many times in the past year have you. .. Used an illegal drug or used a prescription medication for non-medical reasons? Never Filed at: 10/27/2023 1553                      Medical Decision Making  33year old female presenting today with concerns of urgency/dysuria/frequency. UA. WBC and blood in the urine, will treat for suspected urinary tract infection given symptoms. Patient agreeable to plan. Treat with keflex as patient allergic to macrobid. Strict return precautions discussed. Patient at time of discharge well-appearing in no acute distress, all questions answered. Patient agreeable to plan. Patient's vitals, lab/imaging results, diagnosis, and treatment plan were discussed with the patient. All new/changed medications were discussed with patient, specifically, route of administration, how often and when to take, and where they can be picked up. Strict return precautions as well as close follow up with PCP was discussed with the patient and the patient was agreeable to my recommendations. Patient verbally acknowledged understanding of the above communications.  All labs reviewed and utilized in the medical decision making process (if labs were ordered). Portions of the record may have been created with voice recognition software. Occasional wrong word or "sound a like" substitutions may have occurred due to the inherent limitations of voice recognition software. Read the chart carefully and recognize, using context, where substitutions have occurred. Amount and/or Complexity of Data Reviewed  Labs: ordered. Decision-making details documented in ED Course. Risk  Prescription drug management. Disposition  Final diagnoses:   UTI (urinary tract infection)   Hemorrhagic cystitis     Time reflects when diagnosis was documented in both MDM as applicable and the Disposition within this note       Time User Action Codes Description Comment    10/27/2023  3:58 PM Calvin Shy Add [N39.0] UTI (urinary tract infection)     10/27/2023  4:10 PM Calvin Shy Add [N30.91] Hemorrhagic cystitis           ED Disposition       ED Disposition   Discharge    Condition   Stable    Date/Time   Fri Oct 27, 2023 [facility] discharge to home/self care.                    Follow-up Information       Follow up With Specialties Details Why Contact Info Additional [address] Emergency Department Emergency Medicine Go to  If symptoms worsen [address] [phone]  [address] [facility] Emergency Department            Discharge Medication List as of 10/27/2023  4:12 PM        START taking these medications    Details   cephalexin (KEFLEX) 500 mg capsule Take 1 capsule (500 mg total) by mouth every 12 (twelve) hours for 7 days, Starting Fri 10/27/2023, Until Fri 11/3/2023, Normal           CONTINUE these medications which have NOT CHANGED    Details   cholecalciferol (VITAMIN D3) 1,000 units tablet Take 2 tablets (2,000 Units total) by mouth daily Do not start before November 24, 2022., Starting Thu 11/24/2022, Normal      FLUoxetine (PROzac) 20 mg capsule Take 1 capsule (20 mg total) by mouth daily Do not start before November 24, 2022., Starting Thu 11/24/2022, Normal      levonorgestrel (MIRENA) 20 MCG/24HR IUD 1 each by Intrauterine route once, Historical Med      QUEtiapine (SEROquel) 100 mg tablet Take 1 tablet (100 mg total) by mouth every evening, Starting Wed 11/23/2022, Normal      traZODone (DESYREL) 50 mg tablet Take 1 tablet (50 mg total) by mouth daily at bedtime, Starting Wed 11/23/2022, Normal             No discharge procedures on file.     PDMP Review         Value Time User    PDMP Reviewed  Yes 11/23/2022  8:35 AM Cecil Dickson MD            ED Provider  Electronically Signed by             Shaunna Kirby PA-C  10/27/23 1930

## 2023-10-28 LAB — BACTERIA UR CULT: ABNORMAL

## 2023-12-29 ENCOUNTER — HOSPITAL ENCOUNTER (EMERGENCY)
Facility: HOSPITAL | Age: 33
Discharge: HOME | End: 2023-12-29
Attending: EMERGENCY MEDICINE | Admitting: EMERGENCY MEDICINE
Payer: COMMERCIAL

## 2023-12-29 VITALS
HEART RATE: 114 BPM | TEMPERATURE: 98.5 F | DIASTOLIC BLOOD PRESSURE: 62 MMHG | OXYGEN SATURATION: 95 % | WEIGHT: 171.3 LBS | SYSTOLIC BLOOD PRESSURE: 116 MMHG | BODY MASS INDEX: 33.45 KG/M2 | RESPIRATION RATE: 20 BRPM

## 2023-12-29 DIAGNOSIS — F41.0 PANIC ATTACK: Primary | ICD-10-CM

## 2023-12-29 PROCEDURE — 99283 EMERGENCY DEPT VISIT LOW MDM: CPT

## 2023-12-29 PROCEDURE — 99284 EMERGENCY DEPT VISIT MOD MDM: CPT | Performed by: EMERGENCY MEDICINE

## 2023-12-29 RX ORDER — LORAZEPAM 1 MG/1
1 TABLET ORAL ONCE
Status: DISCONTINUED | OUTPATIENT
Start: 2023-12-29 | End: 2023-12-29 | Stop reason: HOSPADM

## 2023-12-30 NOTE — ED PROVIDER NOTES
History  Chief Complaint   Patient presents with    Panic Attack     Pt called [facility] on self d/t panic attack related to being triggered by anger with landlord and boyfriend. Forgot to take anti anxiety meds today.Denies SI.      Patient is a 33-year-old female with a h/o anxiety and PTSD who presents via [facility] after having an anxiety attack.  States attacks usually triggered by anger.  States had issues all day with boyfriend and landlord.  Finally when settling in bed attack triggered.  Has ativan.  Did not take any.  +shaking, dyspnea.  No SI.  Saw therapist today and has another appointment Monday.  Compliant with meds.  No drugs or etoh.          Prior to Admission Medications   Prescriptions Last Dose Informant Patient Reported? Taking?   FLUoxetine (PROzac) 20 mg capsule   No No   Sig: Take 1 capsule (20 mg total) by mouth daily Do not start before 2022.   QUEtiapine (SEROquel) 100 mg tablet   No No   Sig: Take 1 tablet (100 mg total) by mouth every evening   cholecalciferol (VITAMIN D3) 1,000 units tablet   No No   Sig: Take 2 tablets (2,000 Units total) by mouth daily Do not start before 2022.   levonorgestrel (MIRENA) 20 MCG/24HR IUD   Yes No   Si each by Intrauterine route once   traZODone (DESYREL) 50 mg tablet   No No   Sig: Take 1 tablet (50 mg total) by mouth daily at bedtime      Facility-Administered Medications: None       Past Medical History:   Diagnosis Date    ADHD     Anemia     with pregnancy     Anxiety     Depression     Gestational diabetes     2017    History of migraine headaches     History of postpartum hemorrhage     PTSD (post-traumatic stress disorder)        Past Surgical History:   Procedure Laterality Date    NO PAST SURGERIES         Family History   Problem Relation Age of Onset    Anxiety disorder Mother     No Known Problems Father     Anxiety disorder Sister     Anxiety disorder Brother     Other Daughter         non-ketotic hyperglycemia      I  have reviewed and agree with the history as documented.    E-Cigarette/Vaping    E-Cigarette Use Current Every Day User     Start Date 1/1/19      E-Cigarette/Vaping Substances    Nicotine Yes     THC No     CBD No     Flavoring Yes     Other No     Unknown No      Social History     Tobacco Use    Smoking status: Every Day     Types: Cigarettes    Smokeless tobacco: Never   Vaping Use    Vaping status: Every Day    Start date: 1/1/2019    Substances: Nicotine, Flavoring   Substance Use Topics    Alcohol use: Not Currently     Comment: occasional    Drug use: Not Currently     Types: Cocaine       Review of Systems   Constitutional: Negative.    HENT: Negative.     Eyes: Negative.    Respiratory: Negative.     Cardiovascular: Negative.    Gastrointestinal: Negative.    Endocrine: Negative.    Genitourinary: Negative.    Musculoskeletal: Negative.    Skin: Negative.    Allergic/Immunologic: Negative.    Neurological: Negative.    Hematological: Negative.    Psychiatric/Behavioral:  Positive for agitation. The patient is nervous/anxious.    All other systems reviewed and are negative.      Physical Exam  Physical Exam  Vitals and nursing note reviewed.   Constitutional:       Appearance: Normal appearance. She is normal weight.   HENT:      Head: Normocephalic and atraumatic.      Mouth/Throat:      Mouth: Mucous membranes are moist.   Cardiovascular:      Rate and Rhythm: Regular rhythm. Tachycardia present.      Pulses: Normal pulses.      Heart sounds: Normal heart sounds.   Pulmonary:      Effort: Pulmonary effort is normal.      Breath sounds: Normal breath sounds.   Abdominal:      General: Bowel sounds are normal.   Musculoskeletal:      Cervical back: Normal range of motion and neck supple.   Skin:     General: Skin is warm and dry.      Capillary Refill: Capillary refill takes less than 2 seconds.   Neurological:      General: No focal deficit present.      Mental Status: She is alert and oriented to person,  place, and time.      Cranial Nerves: No cranial nerve deficit.      Motor: No weakness.      Gait: Gait normal.   Psychiatric:         Attention and Perception: Attention normal.         Mood and Affect: Mood is anxious.         Speech: Speech is rapid and pressured.         Behavior: Behavior is cooperative.         Thought Content: Thought content is not paranoid. Thought content does not include homicidal or suicidal ideation.         Vital Signs  ED Triage Vitals [12/29/23 2046]   Temperature Pulse Respirations Blood Pressure SpO2   98.5 °F (36.9 °C) (!) 114 20 116/62 95 %      Temp Source Heart Rate Source Patient Position - Orthostatic VS BP Location FiO2 (%)   Tympanic Monitor Sitting Left arm --      Pain Score       --           Vitals:    12/29/23 2046   BP: 116/62   Pulse: (!) 114   Patient Position - Orthostatic VS: Sitting         Visual Acuity      ED Medications  Medications   LORazepam (ATIVAN) tablet 1 mg (has no administration in time range)       Diagnostic Studies  Results Reviewed       None                   No orders to display              Procedures  Procedures         ED Course  ED Course as of 12/29/23 2247   Fri Dec 29, 2023   2141 Patient feeling improved.  Boyfriend to take patient home.                                               Medical Decision Making  Problems Addressed:  Panic attack: acute illness or injury     Details: Improved post meds.  No SI.  Boyfriend to take patient home.     Amount and/or Complexity of Data Reviewed  Independent Historian: EMS    Risk  Prescription drug management.             Disposition  Final diagnoses:   Panic attack     Time reflects when diagnosis was documented in both MDM as applicable and the Disposition within this note       Time User Action Codes Description Comment    12/29/2023  9:41 PM Sam Moncada Add [F41.0] Panic attack           ED Disposition       ED Disposition   Discharge    Condition   Stable    Date/Time   Fri Dec 29, 2023   9:41 PM    Comment   Gissel Leigh discharge to home/self care.                   Follow-up Information       Follow up With Specialties Details Why Contact Info        Please follow up with your therapist as scheduled on Monday.            Discharge Medication List as of 12/29/2023  9:41 PM        CONTINUE these medications which have NOT CHANGED    Details   cholecalciferol (VITAMIN D3) 1,000 units tablet Take 2 tablets (2,000 Units total) by mouth daily Do not start before November 24, 2022., Starting Thu 11/24/2022, Normal      FLUoxetine (PROzac) 20 mg capsule Take 1 capsule (20 mg total) by mouth daily Do not start before November 24, 2022., Starting Thu 11/24/2022, Normal      levonorgestrel (MIRENA) 20 MCG/24HR IUD 1 each by Intrauterine route once, Historical Med      QUEtiapine (SEROquel) 100 mg tablet Take 1 tablet (100 mg total) by mouth every evening, Starting Wed 11/23/2022, Normal      traZODone (DESYREL) 50 mg tablet Take 1 tablet (50 mg total) by mouth daily at bedtime, Starting Wed 11/23/2022, Normal             No discharge procedures on file.    PDMP Review         Value Time User    PDMP Reviewed  Yes 11/23/2022  8:35 AM Daphney Gunn MD            ED Provider  Electronically Signed by             Sam oMncada MD  12/29/23 0823

## 2024-01-16 ENCOUNTER — APPOINTMENT (EMERGENCY)
Dept: RADIOLOGY | Facility: HOSPITAL | Age: 34
End: 2024-01-16
Payer: COMMERCIAL

## 2024-01-16 ENCOUNTER — HOSPITAL ENCOUNTER (EMERGENCY)
Facility: HOSPITAL | Age: 34
Discharge: HOME | End: 2024-01-16
Attending: EMERGENCY MEDICINE
Payer: COMMERCIAL

## 2024-01-16 VITALS
WEIGHT: 166.5 LBS | BODY MASS INDEX: 32.52 KG/M2 | DIASTOLIC BLOOD PRESSURE: 76 MMHG | HEART RATE: 90 BPM | OXYGEN SATURATION: 96 % | SYSTOLIC BLOOD PRESSURE: 146 MMHG | TEMPERATURE: 100.5 F | RESPIRATION RATE: 18 BRPM

## 2024-01-16 DIAGNOSIS — J40 BRONCHITIS: Primary | ICD-10-CM

## 2024-01-16 LAB
B PARAPERT DNA UPPER RESP QL NAA+PROBE: NOT DETECTED
B PERT DNA UPPER RESP QL NAA+PROBE: NOT DETECTED

## 2024-01-16 PROCEDURE — 87798 DETECT AGENT NOS DNA AMP: CPT

## 2024-01-16 PROCEDURE — 99284 EMERGENCY DEPT VISIT MOD MDM: CPT

## 2024-01-16 PROCEDURE — 99285 EMERGENCY DEPT VISIT HI MDM: CPT

## 2024-01-16 PROCEDURE — 87636 SARSCOV2 & INF A&B AMP PRB: CPT

## 2024-01-16 PROCEDURE — 71046 X-RAY EXAM CHEST 2 VIEWS: CPT

## 2024-01-16 RX ORDER — ALBUTEROL SULFATE 90 UG/1
2 AEROSOL, METERED RESPIRATORY (INHALATION) ONCE
Status: COMPLETED | OUTPATIENT
Start: 2024-01-16 | End: 2024-01-16

## 2024-01-16 RX ORDER — ACETAMINOPHEN 325 MG/1
650 TABLET ORAL ONCE
Status: COMPLETED | OUTPATIENT
Start: 2024-01-16 | End: 2024-01-16

## 2024-01-16 RX ORDER — LIDOCAINE 50 MG/G
1 PATCH TOPICAL EVERY 24 HOURS
Qty: 15 PATCH | Refills: 0 | Status: SHIPPED | OUTPATIENT
Start: 2024-01-16 | End: 2024-01-31

## 2024-01-16 RX ORDER — SENNOSIDES 8.6 MG
650 CAPSULE ORAL EVERY 8 HOURS PRN
Qty: 30 TABLET | Refills: 0 | Status: SHIPPED | OUTPATIENT
Start: 2024-01-16

## 2024-01-16 RX ORDER — LIDOCAINE 50 MG/G
1 PATCH TOPICAL ONCE
Status: DISCONTINUED | OUTPATIENT
Start: 2024-01-16 | End: 2024-01-16 | Stop reason: HOSPADM

## 2024-01-16 RX ORDER — BENZONATATE 100 MG/1
100 CAPSULE ORAL EVERY 8 HOURS
Qty: 21 CAPSULE | Refills: 0 | Status: SHIPPED | OUTPATIENT
Start: 2024-01-16

## 2024-01-16 RX ADMIN — LIDOCAINE 1 PATCH: 700 PATCH TOPICAL at 14:06

## 2024-01-16 RX ADMIN — ALBUTEROL SULFATE 2 PUFF: 90 AEROSOL, METERED RESPIRATORY (INHALATION) at 14:44

## 2024-01-16 RX ADMIN — ACETAMINOPHEN 650 MG: 325 TABLET ORAL at 14:05

## 2024-01-16 NOTE — DISCHARGE INSTRUCTIONS
Follow up with PCP.  Return to ED for new or worsening symptoms as discussed.  We will call with any positive test results from your COVID-19/influenza test or pertussis test and make any necessary changes to treatment plan at that time.  Keep Tessalon Perles away from any pets or children as they can be deadly.

## 2024-01-17 LAB
FLUAV RNA RESP QL NAA+PROBE: POSITIVE
FLUBV RNA RESP QL NAA+PROBE: NEGATIVE
SARS-COV-2 RNA RESP QL NAA+PROBE: NEGATIVE

## 2024-01-17 NOTE — ED PROVIDER NOTES
"Chief Complaint   Patient presents with    Cough     Pt came to ER with congested cough for one month. Pt stated \"I have bronchitis.\" Pt reports pain with cough. Pt reports body aches.          HPI: Patient is a 33 y.o. female past medical history of migraines, ADHD, anemia, anxiety, depression who presents with 1 days of  worsening cough, sob, myalgias, pain with coughing, congestion  , headache, which the patient describes at moderate. She reports she has had a cough for 1 month.  The patient has had contact with people with similar symptoms.  The patient has not taken any medication.  Denies fever, chest pain when not coughing, hemoptysis, difficulty swallowing, voice change, neck pain or stiffness, rashes, vision change, abdominal pain, vomiting, hearing loss, weakness, syncope, swelling, color change.    Allergies   Allergen Reactions    Macrobid [Nitrofurantoin] Swelling    Ibuprofen Itching    Sulfamethoxazole-Trimethoprim Facial Swelling     Face swelling       Past Medical History:   Diagnosis Date    ADHD     Anemia     with pregnancy     Anxiety     Depression     Gestational diabetes     2017    History of migraine headaches     History of postpartum hemorrhage     PTSD (post-traumatic stress disorder)       Past Surgical History:   Procedure Laterality Date    NO PAST SURGERIES       Social History     Tobacco Use    Smoking status: Every Day     Types: Cigarettes    Smokeless tobacco: Never   Vaping Use    Vaping status: Every Day    Start date: 1/1/2019    Substances: Nicotine, Flavoring   Substance Use Topics    Alcohol use: Not Currently     Comment: occasional    Drug use: Not Currently     Types: Cocaine       Nursing notes reviewed  Physical Exam:  ED Triage Vitals   Temperature Pulse Respirations Blood Pressure SpO2   01/16/24 1258 01/16/24 1258 01/16/24 1258 01/16/24 1258 01/16/24 1258   100.5 °F (38.1 °C) 90 18 92/66 96 %      Temp Source Heart Rate Source Patient Position - Orthostatic VS BP " Location FiO2 (%)   01/16/24 1258 01/16/24 1258 01/16/24 1258 01/16/24 1258 --   Tympanic Monitor Sitting Left arm       Pain Score       01/16/24 1405       10 - Worst Possible Pain           ROS: Positive for cough, sob, myalgias, pain with coughing, congestion , headache, the remainder of a 10 organ system ROS was otherwise unremarkable.  General: awake, alert, no acute distress  Head: normocephalic, atraumatic  Eyes: no scleral icterus, no discharge   Ears: external ears normal, hearing grossly intact  Nose: external exam grossly normal, negative nasal discharge, congestion   Mouth:  Erythematous oropharynx without swelling or exudate.  Uvula midline.  No tonsillar swelling, exudates or abscesses. Patient maintaining airway and secretions. No stridor . No brawniness under tongue.   Neck: symmetric, No JVD noted, trachea midline, no anterior cervical lymphadenopathy and a full range of motion of neck without pain  Pulmonary: Patient in no respiratory distress, speaking in full sentences, managing oral secretions without difficulty, no accessory muscle use, retractions, or belly breathing noted, no adventitious lung sounds auscultated bilaterally.  Cardiovascular: appears well perfused, RRR, no murmurs   Abdomen: no distention noted, no tenderness   Musculoskeletal: no deformities noted, tone normal  Neuro: grossly non-focal  Psych: mood and affect appropriate  Skin: warm, dry, no rash     Results Reviewed       Procedure Component Value Units Date/Time    FLU/COVID - if FLU clinically relevant [[id number]]  (Abnormal) Collected: 01/16/24 1357    Lab Status: Final result Specimen: Nares from Nose Updated: 01/17/24 1106     SARS-CoV-2 Negative     INFLUENZA A PCR Positive     INFLUENZA B PCR Negative    Narrative:      FOR PEDIATRIC PATIENTS - copy/paste COVID Guidelines URL to browser: [email or web address]    SARS-CoV-2 assay is a Nucleic Acid Amplification  assay intended for the  qualitative detection of nucleic acid from SARS-CoV-2 in nasopharyngeal  swabs. Results are for the presumptive identification of SARS-CoV-2 RNA.    Positive results are indicative of infection with SARS-CoV-2, the virus  causing COVID-19, but do not rule out bacterial infection or co-infection  with other viruses. Laboratories within the [place] and its  territories are required to report all positive results to the appropriate  public health authorities. Negative results do not preclude SARS-CoV-2  infection and should not be used as the sole basis for treatment or other  patient management decisions. Negative results must be combined with  clinical observations, patient history, and epidemiological information.  This test has not been FDA cleared or approved.    This test has been authorized by FDA under an Emergency Use Authorization  (EUA). This test is only authorized for the duration of time the  declaration that circumstances exist justifying the authorization of the  emergency use of an in vitro diagnostic tests for detection of SARS-CoV-2  virus and/or diagnosis of COVID-19 infection under section 564(b)(1) of  the Act, 21 U.S.C. 360bbb-3(b)(1), unless the authorization is terminated  or revoked sooner. The test has been validated but independent review by FDA  and CLIA is pending.    Test performed using the Roche kathy 6800 System: This RT-PCR assay  targets ORF1, a region unique to SARS-CoV-2. A conserved region in the  E-gene was chosen for pan-Sarbecovirus detection which includes  SARS-CoV-2.    According to CMS-2020-01-R, this platform meets the definition of high-throughput technology.      Bordetella pertussis / parapertussis PCR [[id number]]  (Normal) Collected: 01/16/24 1357    Lab Status: Final result Specimen: Nasopharyngeal from Nose Updated: 01/16/24 8233    Narrative:      The following orders were created for panel order Bordetella pertussis / parapertussis  PCR.  Procedure                               Abnormality         Status                     ---------                               -----------         ------                     Bordetella pertussis / p...[[id number]]  Normal              Final result                 Please view results for these tests on the individual orders.    Bordetella pertussis / parapertussis PCR [[id number]]  (Normal) Collected: 01/16/24 3457    Lab Status: Final result Specimen: Nasopharyngeal from Nose Updated: 01/16/24 2133     Bordetella Pertussis PCR Not Detected     Bordetella Parapertussis PCR Not Detected    Narrative:      This test has been performed using real-time PCR on the [employer]. This test has been FDA-cleared, validated by the , and verified by the performing laboratory.    Results are for the presumptive identification of Bordetella pertussis and/or Bordetella parapertussis DNA in the patient sample. This test is intended for use in conjunction with clinical presentation and other laboratory markers for the clinical management of Bordetella pertussis and/or Bordetella parapertussis    Positive results are indicative of infection, but do not rule out bacterial infection or co-infection with other bacteria or viruses. Negative results do not preclude infection and should not be used as the sole basis for treatment or other patient management decisions.            XR chest 2 views   ED Interpretation by Chrissie Dorsey PA-C (01/16 1425)   No focal consolidation. No PTX. No effusion.       Final Result by Ulysses Oconnor MD (01/16 2105)      No radiographic evidence of acute intrathoracic process.                  Workstation performed: [id number]             DDX includes but not limited to rhinitis, sinusitis, pertussis, bronchitis, PNA, influenza, Covid-19, allergies, asthma, parainfluenza, toxic exposure, foreign body The patient is stable and has a history and physical exam  consistent with a viral illness. COVID19?influenza testing has been performed.   No respiratory distress. Afebrile.  Chest x-ray without acute cardiopulmonary disease my wet read including no evidence of pneumonia or pneumothorax. Low clinical suspicion for RPA/PTA given exam findings. centor score of 0, do not feel strep testing is necessary at this time. No over indications for antibiotics. I considered the patient's other medical conditions as applicable/noted above in my medical decision making.  The patient improved upon discharge. The plan is for supportive care at home.    The patient (and any family present) verbalized understanding of the discharge instructions and warnings that would necessitate return to the Emergency Department.  All questions were answered prior to discharge.    Medications   acetaminophen (TYLENOL) tablet 650 mg (650 mg Oral Given 1/16/24 1405)   albuterol (PROVENTIL HFA,VENTOLIN HFA) inhaler 2 puff (2 puffs Inhalation Given 1/16/24 1444)     Final diagnoses:   Bronchitis     Time reflects when diagnosis was documented in both MDM as applicable and the Disposition within this note       Time User Action Codes Description Comment    1/16/2024  2:36 PM Chrissie Dorsey [[id number]] Bronchitis           ED Disposition       ED Disposition   Discharge    Condition   Stable    Date/Time   Tue Jan 16, 2024  2:36 PM    Comment   Gissel Leigh discharge to home/self care.                   Follow-up Information       Follow up With Specialties Details Why Contact Info    Abel Mcpherson DO Family Medicine   [address] [place] [address] [phone]            Discharge Medication List as of 1/16/2024  2:48 PM        START taking these medications    Details   acetaminophen (TYLENOL) 650 mg CR tablet Take 1 tablet (650 mg total) by mouth every 8 (eight) hours as needed for mild pain, Starting Tue 1/16/2024, Normal      benzonatate (TESSALON PERLES) 100 mg capsule Take 1 capsule (100  mg total) by mouth every 8 (eight) hours, Starting Tue 1/16/2024, Normal      lidocaine (LIDODERM) 5 % Apply 1 patch topically over 12 hours every 24 hours for 15 days Remove & Discard patch within 12 hours or as directed by MD, Starting Tue 1/16/2024, Until Wed 1/31/2024, Normal           CONTINUE these medications which have NOT CHANGED    Details   cholecalciferol (VITAMIN D3) 1,000 units tablet Take 2 tablets (2,000 Units total) by mouth daily Do not start before November 24, 2022., Starting Thu 11/24/2022, Normal      FLUoxetine (PROzac) 20 mg capsule Take 1 capsule (20 mg total) by mouth daily Do not start before November 24, 2022., Starting Thu 11/24/2022, Normal      levonorgestrel (MIRENA) 20 MCG/24HR IUD 1 each by Intrauterine route once, Historical Med      QUEtiapine (SEROquel) 100 mg tablet Take 1 tablet (100 mg total) by mouth every evening, Starting Wed 11/23/2022, Normal      traZODone (DESYREL) 50 mg tablet Take 1 tablet (50 mg total) by mouth daily at bedtime, Starting Wed 11/23/2022, Normal           No discharge procedures on file.    Electronically Signed by       Chrissie Dorsey PA-C  01/18/24 6668

## 2024-01-18 NOTE — RESULT ENCOUNTER NOTE
Patient called back, informed of positive test results.  Not a candidate for Tamiflu.  Discussed supportive care.  Given opportunity ask any questions at this time.  All answered appropriately.

## 2024-03-13 ENCOUNTER — HOSPITAL ENCOUNTER (EMERGENCY)
Facility: HOSPITAL | Age: 34
Discharge: HOME | End: 2024-03-13
Attending: EMERGENCY MEDICINE
Payer: COMMERCIAL

## 2024-03-13 ENCOUNTER — TELEPHONE (OUTPATIENT)
Dept: OTHER | Facility: OTHER | Age: 34
End: 2024-03-13

## 2024-03-13 VITALS
RESPIRATION RATE: 15 BRPM | DIASTOLIC BLOOD PRESSURE: 81 MMHG | BODY MASS INDEX: 32.46 KG/M2 | WEIGHT: 165.34 LBS | TEMPERATURE: 97.9 F | SYSTOLIC BLOOD PRESSURE: 157 MMHG | HEART RATE: 89 BPM | HEIGHT: 60 IN | OXYGEN SATURATION: 98 %

## 2024-03-13 DIAGNOSIS — H60.502 ACUTE OTITIS EXTERNA OF LEFT EAR, UNSPECIFIED TYPE: Primary | ICD-10-CM

## 2024-03-13 PROCEDURE — 99282 EMERGENCY DEPT VISIT SF MDM: CPT

## 2024-03-13 PROCEDURE — 99284 EMERGENCY DEPT VISIT MOD MDM: CPT | Performed by: EMERGENCY MEDICINE

## 2024-03-13 RX ORDER — NEOMYCIN SULFATE, POLYMYXIN B SULFATE AND HYDROCORTISONE 10; 3.5; 1 MG/ML; MG/ML; [USP'U]/ML
4 SUSPENSION/ DROPS AURICULAR (OTIC) 3 TIMES DAILY
Qty: 10 ML | Refills: 0 | Status: SHIPPED | OUTPATIENT
Start: 2024-03-13 | End: 2024-03-23

## 2024-03-13 RX ORDER — RISPERIDONE 2 MG/1
TABLET ORAL
COMMUNITY

## 2024-03-13 RX ORDER — NEOMYCIN SULFATE, POLYMYXIN B SULFATE AND HYDROCORTISONE 10; 3.5; 1 MG/ML; MG/ML; [USP'U]/ML
4 SUSPENSION/ DROPS AURICULAR (OTIC) ONCE
Status: COMPLETED | OUTPATIENT
Start: 2024-03-13 | End: 2024-03-13

## 2024-03-13 RX ORDER — LORAZEPAM 0.5 MG/1
TABLET ORAL EVERY 8 HOURS PRN
COMMUNITY

## 2024-03-13 RX ORDER — DEXTROAMPHETAMINE SACCHARATE, AMPHETAMINE ASPARTATE, DEXTROAMPHETAMINE SULFATE AND AMPHETAMINE SULFATE 1.25; 1.25; 1.25; 1.25 MG/1; MG/1; MG/1; MG/1
TABLET ORAL DAILY
COMMUNITY

## 2024-03-13 RX ADMIN — NEOMYCIN SULFATE, POLYMYXIN B SULFATE AND HYDROCORTISONE 4 DROP: 3.5; 10000; 1 SUSPENSION AURICULAR (OTIC) at 09:33

## 2024-03-13 NOTE — TELEPHONE ENCOUNTER
Patient received a call from office. Called back in regards to follow up care from ED. Her best callback number is [phone]

## 2024-03-13 NOTE — ED PROVIDER NOTES
History  Chief Complaint   Patient presents with   • Earache     Patient reports pain behind left  ear  x 5 days. Reports pain and numbness to left side of face, noticed numbness at 0400     HPI    Prior to Admission Medications   Prescriptions Last Dose Informant Patient Reported? Taking?   FLUoxetine (PROzac) 20 mg capsule   No Yes   Sig: Take 1 capsule (20 mg total) by mouth daily Do not start before 2022.   LORazepam (Ativan) 0.5 mg tablet   Yes Yes   Sig: Take by mouth every 8 (eight) hours as needed for anxiety   acetaminophen (TYLENOL) 650 mg CR tablet   No No   Sig: Take 1 tablet (650 mg total) by mouth every 8 (eight) hours as needed for mild pain   amphetamine-dextroamphetamine (ADDERALL, 5MG,) 5 MG tablet   Yes Yes   Sig: daily   benzonatate (TESSALON PERLES) 100 mg capsule   No No   Sig: Take 1 capsule (100 mg total) by mouth every 8 (eight) hours   cholecalciferol (VITAMIN D3) 1,000 units tablet   No No   Sig: Take 2 tablets (2,000 Units total) by mouth daily Do not start before 2022.   levonorgestrel (MIRENA) 20 MCG/24HR IUD   Yes No   Si each by Intrauterine route once   lidocaine (LIDODERM) 5 %   No No   Sig: Apply 1 patch topically over 12 hours every 24 hours for 15 days Remove & Discard patch within 12 hours or as directed by MD   risperiDONE (RisperDAL) 2 mg tablet   Yes Yes      Facility-Administered Medications: None       Past Medical History:   Diagnosis Date   • ADHD    • Anemia     with pregnancy    • Anxiety    • Depression    • Gestational diabetes        • History of migraine headaches    • History of postpartum hemorrhage    • PTSD (post-traumatic stress disorder)        Past Surgical History:   Procedure Laterality Date   • NO PAST SURGERIES         Family History   Problem Relation Age of Onset   • Anxiety disorder Mother    • No Known Problems Father    • Anxiety disorder Sister    • Anxiety disorder Brother    • Other Daughter         non-ketotic  hyperglycemia      I have reviewed and agree with the history as documented.    E-Cigarette/Vaping   • E-Cigarette Use Current Every Day User    • Start Date 1/1/19      E-Cigarette/Vaping Substances   • Nicotine Yes    • THC No    • CBD No    • Flavoring Yes    • Other No    • Unknown No      Social History     Tobacco Use   • Smoking status: Every Day     Types: Cigarettes   • Smokeless tobacco: Never   Vaping Use   • Vaping status: Every Day   • Start date: 1/1/2019   • Substances: Nicotine, Flavoring   Substance Use Topics   • Alcohol use: Not Currently     Comment: occasional   • Drug use: Not Currently     Types: Cocaine       Review of Systems    Physical Exam  Physical Exam    Vital Signs  ED Triage Vitals [03/13/24 0907]   Temperature Pulse Respirations Blood Pressure SpO2   97.9 °F (36.6 °C) 89 15 157/81 98 %      Temp src Heart Rate Source Patient Position - Orthostatic VS BP Location FiO2 (%)   -- -- -- -- --      Pain Score       9           Vitals:    03/13/24 0907   BP: 157/81   Pulse: 89         Visual Acuity  Visual Acuity      Flowsheet Row Most Recent Value   L Pupil Size (mm) 3   R Pupil Size (mm) 3            ED Medications  Medications - No data to display    Diagnostic Studies  Results Reviewed       None                   No orders to display              Procedures  Procedures         ED Course  ED Course as of 03/13/24 0935   Wed Mar 13, 2024   0933 Normal CN II-XII exam. Sharp vs dull discrimination intact over scalp and face.   0933 Ear wick placed left ear.                               SBIRT 20yo+      Flowsheet Row Most Recent Value   Initial Alcohol Screen: US AUDIT-C     1. How often do you have a drink containing alcohol? 0 Filed at: 03/13/2024 0915   2. How many drinks containing alcohol do you have on a typical day you are drinking?  0 Filed at: 03/13/2024 0915   3b. FEMALE Any Age, or MALE 65+: How often do you have 4 or more drinks on one occassion? 0 Filed at: 03/13/2024 0915    Audit-C Score 0 Filed at: 03/13/2024 0915   ROSANNA: How many times in the past year have you...    Used an illegal drug or used a prescription medication for non-medical reasons? Never Filed at: 03/13/2024 0915                      Medical Decision Making           Disposition  Final diagnoses:   None     ED Disposition       None          Follow-up Information    None         Patient's Medications   Discharge Prescriptions    No medications on file       No discharge procedures on file.    PDMP Review         Value Time User    PDMP Reviewed  Yes 11/23/2022  8:35 AM Daphney Gunn MD            ED Provider  Electronically Signed by           Never Filed at: 03/13/2024 0915                      Medical Decision Making  Patient with acute external otitis. Canal quite swollen and required placement of ear wick.     Risk  Prescription drug management.             Disposition  Final diagnoses:   Acute otitis externa of left ear, unspecified type     Time reflects when diagnosis was documented in both MDM as applicable and the Disposition within this note       Time User Action Codes Description Comment    3/13/2024  9:35 AM Ariana Serra [H60.502] Acute otitis externa of left ear, unspecified type           ED Disposition       ED Disposition   Discharge    Condition   Stable    Date/Time   Wed Mar 13, 2024 0934    Comment   Gissel Leigh discharge to home/self care.                   Follow-up Information    None         Discharge Medication List as of 3/13/2024  9:38 AM        START taking these medications    Details   neomycin-polymyxin-hydrocortisone (CORTISPORIN) 0.35%-10,000 units/mL-1% otic suspension Administer 4 drops into the left ear 3 (three) times a day for 10 days, Starting Wed 3/13/2024, Until Sat 3/23/2024, Normal           CONTINUE these medications which have NOT CHANGED    Details   amphetamine-dextroamphetamine (ADDERALL, 5MG,) 5 MG tablet daily, Historical Med      FLUoxetine (PROzac) 20 mg capsule Take 1 capsule (20 mg total) by mouth daily Do not start before November 24, 2022., Starting u 11/24/2022, Normal      LORazepam (Ativan) 0.5 mg tablet Take by mouth every 8 (eight) hours as needed for anxiety, Historical Med      risperiDONE (RisperDAL) 2 mg tablet Historical Med      acetaminophen (TYLENOL) 650 mg CR tablet Take 1 tablet (650 mg total) by mouth every 8 (eight) hours as needed for mild pain, Starting Tue 1/16/2024, Normal      benzonatate (TESSALON PERLES) 100 mg capsule Take 1 capsule (100 mg total) by mouth every 8 (eight) hours, Starting Tue 1/16/2024, Normal      cholecalciferol (VITAMIN D3) 1,000 units  tablet Take 2 tablets (2,000 Units total) by mouth daily Do not start before November 24, 2022., Starting Thu 11/24/2022, Normal      levonorgestrel (MIRENA) 20 MCG/24HR IUD 1 each by Intrauterine route once, Historical Med      lidocaine (LIDODERM) 5 % Apply 1 patch topically over 12 hours every 24 hours for 15 days Remove & Discard patch within 12 hours or as directed by MD, Starting Tue 1/16/2024, Until Wed 1/31/2024, Normal                 PDMP Review         Value Time User    PDMP Reviewed  Yes 11/23/2022  8:35 AM Daphney Gunn MD            ED Provider  Electronically Signed by             Ariana Serra MD  03/21/24 0684

## 2024-03-13 NOTE — Clinical Note
Gissel Leigh was seen and treated in our emergency department on 3/13/2024.                Diagnosis:     Gissel  may return to work on return date.    She may return on this date: 03/14/2024         If you have any questions or concerns, please don't hesitate to call.      Ariana Serra MD    ______________________________           _______________          _______________  Hospital Representative                              Date                                Time

## 2024-03-14 RX ORDER — ACETAMINOPHEN 500 MG
500 TABLET ORAL EVERY 6 HOURS PRN
Qty: 30 TABLET | Refills: 0 | Status: SHIPPED | OUTPATIENT
Start: 2024-03-14

## 2024-03-20 ENCOUNTER — TELEPHONE (OUTPATIENT)
Age: 34
End: 2024-03-20

## 2024-03-20 NOTE — TELEPHONE ENCOUNTER
Patient called for an appointment for her ears. She has only been seen in the ER and does not have a PCP. Patient instructed to establish care with a PCP. Patient will call the number on her card and schedule.

## 2024-04-15 ENCOUNTER — TELEPHONE (OUTPATIENT)
Dept: OBGYN CLINIC | Facility: CLINIC | Age: 34
End: 2024-04-15

## 2024-05-07 ENCOUNTER — VBI (OUTPATIENT)
Dept: ADMINISTRATIVE | Facility: OTHER | Age: 34
End: 2024-05-07

## 2024-06-07 ENCOUNTER — APPOINTMENT (EMERGENCY)
Dept: CT IMAGING | Facility: HOSPITAL | Age: 34
End: 2024-06-07
Payer: COMMERCIAL

## 2024-06-07 ENCOUNTER — HOSPITAL ENCOUNTER (EMERGENCY)
Facility: HOSPITAL | Age: 34
Discharge: HOME | End: 2024-06-07
Attending: EMERGENCY MEDICINE
Payer: COMMERCIAL

## 2024-06-07 VITALS
WEIGHT: 152.12 LBS | DIASTOLIC BLOOD PRESSURE: 78 MMHG | HEART RATE: 96 BPM | BODY MASS INDEX: 29.71 KG/M2 | OXYGEN SATURATION: 96 % | RESPIRATION RATE: 18 BRPM | TEMPERATURE: 98.1 F | SYSTOLIC BLOOD PRESSURE: 122 MMHG

## 2024-06-07 DIAGNOSIS — N83.209 OVARIAN CYST: ICD-10-CM

## 2024-06-07 DIAGNOSIS — R10.9 ABDOMINAL PAIN, UNSPECIFIED ABDOMINAL LOCATION: Primary | ICD-10-CM

## 2024-06-07 LAB
ALBUMIN SERPL BCP-MCNC: 4.5 G/DL (ref 3.5–5)
ALP SERPL-CCNC: 50 U/L (ref 34–104)
ALT SERPL W P-5'-P-CCNC: 20 U/L (ref 7–52)
ANION GAP SERPL CALCULATED.3IONS-SCNC: 9 MMOL/L (ref 4–13)
AST SERPL W P-5'-P-CCNC: 15 U/L (ref 13–39)
BACTERIA UR QL AUTO: ABNORMAL /HPF
BASOPHILS # BLD AUTO: 0.03 THOUSANDS/ÂΜL (ref 0–0.1)
BASOPHILS NFR BLD AUTO: 0 % (ref 0–1)
BILIRUB SERPL-MCNC: 0.49 MG/DL (ref 0.2–1)
BILIRUB UR QL STRIP: ABNORMAL
BUN SERPL-MCNC: 11 MG/DL (ref 5–25)
CALCIUM SERPL-MCNC: 9.8 MG/DL (ref 8.4–10.2)
CAOX CRY URNS QL MICRO: ABNORMAL /HPF
CHLORIDE SERPL-SCNC: 104 MMOL/L (ref 96–108)
CLARITY UR: ABNORMAL
CO2 SERPL-SCNC: 26 MMOL/L (ref 21–32)
COLOR UR: ABNORMAL
CREAT SERPL-MCNC: 0.84 MG/DL (ref 0.6–1.3)
EOSINOPHIL # BLD AUTO: 0.15 THOUSAND/ÂΜL (ref 0–0.61)
EOSINOPHIL NFR BLD AUTO: 2 % (ref 0–6)
ERYTHROCYTE [DISTWIDTH] IN BLOOD BY AUTOMATED COUNT: 12.4 % (ref 11.6–15.1)
EXT PREGNANCY TEST URINE: NEGATIVE
EXT. CONTROL: NORMAL
GFR SERPL CREATININE-BSD FRML MDRD: 92 ML/MIN/1.73SQ M
GLUCOSE SERPL-MCNC: 94 MG/DL (ref 65–140)
GLUCOSE UR STRIP-MCNC: NEGATIVE MG/DL
HCT VFR BLD AUTO: 38.2 % (ref 34.8–46.1)
HGB BLD-MCNC: 13.2 G/DL (ref 11.5–15.4)
HGB UR QL STRIP.AUTO: 250
IMM GRANULOCYTES # BLD AUTO: 0.01 THOUSAND/UL (ref 0–0.2)
IMM GRANULOCYTES NFR BLD AUTO: 0 % (ref 0–2)
KETONES UR STRIP-MCNC: ABNORMAL MG/DL
LEUKOCYTE ESTERASE UR QL STRIP: 25
LIPASE SERPL-CCNC: 29 U/L (ref 11–82)
LYMPHOCYTES # BLD AUTO: 2.57 THOUSANDS/ÂΜL (ref 0.6–4.47)
LYMPHOCYTES NFR BLD AUTO: 25 % (ref 14–44)
MCH RBC QN AUTO: 29.2 PG (ref 26.8–34.3)
MCHC RBC AUTO-ENTMCNC: 34.6 G/DL (ref 31.4–37.4)
MCV RBC AUTO: 85 FL (ref 82–98)
MONOCYTES # BLD AUTO: 0.56 THOUSAND/ÂΜL (ref 0.17–1.22)
MONOCYTES NFR BLD AUTO: 6 % (ref 4–12)
NEUTROPHILS # BLD AUTO: 6.9 THOUSANDS/ÂΜL (ref 1.85–7.62)
NEUTS SEG NFR BLD AUTO: 67 % (ref 43–75)
NITRITE UR QL STRIP: NEGATIVE
NON-SQ EPI CELLS URNS QL MICRO: ABNORMAL /HPF
NRBC BLD AUTO-RTO: 0 /100 WBCS
PH UR STRIP.AUTO: 6.5 [PH]
PLATELET # BLD AUTO: 300 THOUSANDS/UL (ref 149–390)
PMV BLD AUTO: 10.2 FL (ref 8.9–12.7)
POTASSIUM SERPL-SCNC: 3.4 MMOL/L (ref 3.5–5.3)
PROT SERPL-MCNC: 7.5 G/DL (ref 6.4–8.4)
PROT UR STRIP-MCNC: >=500 MG/DL
RBC # BLD AUTO: 4.52 MILLION/UL (ref 3.81–5.12)
RBC #/AREA URNS AUTO: ABNORMAL /HPF
SODIUM SERPL-SCNC: 139 MMOL/L (ref 135–147)
SP GR UR STRIP.AUTO: 1.02 (ref 1–1.04)
UROBILINOGEN UA: NEGATIVE MG/DL
WBC # BLD AUTO: 10.22 THOUSAND/UL (ref 4.31–10.16)
WBC #/AREA URNS AUTO: ABNORMAL /HPF

## 2024-06-07 PROCEDURE — 96374 THER/PROPH/DIAG INJ IV PUSH: CPT

## 2024-06-07 PROCEDURE — 99284 EMERGENCY DEPT VISIT MOD MDM: CPT

## 2024-06-07 PROCEDURE — 96361 HYDRATE IV INFUSION ADD-ON: CPT

## 2024-06-07 PROCEDURE — 83690 ASSAY OF LIPASE: CPT | Performed by: EMERGENCY MEDICINE

## 2024-06-07 PROCEDURE — 81003 URINALYSIS AUTO W/O SCOPE: CPT | Performed by: EMERGENCY MEDICINE

## 2024-06-07 PROCEDURE — 99285 EMERGENCY DEPT VISIT HI MDM: CPT | Performed by: EMERGENCY MEDICINE

## 2024-06-07 PROCEDURE — 96375 TX/PRO/DX INJ NEW DRUG ADDON: CPT

## 2024-06-07 PROCEDURE — 74177 CT ABD & PELVIS W/CONTRAST: CPT

## 2024-06-07 PROCEDURE — 36415 COLL VENOUS BLD VENIPUNCTURE: CPT | Performed by: EMERGENCY MEDICINE

## 2024-06-07 PROCEDURE — 85025 COMPLETE CBC W/AUTO DIFF WBC: CPT | Performed by: EMERGENCY MEDICINE

## 2024-06-07 PROCEDURE — 81001 URINALYSIS AUTO W/SCOPE: CPT | Performed by: EMERGENCY MEDICINE

## 2024-06-07 PROCEDURE — 81025 URINE PREGNANCY TEST: CPT | Performed by: EMERGENCY MEDICINE

## 2024-06-07 PROCEDURE — 80053 COMPREHEN METABOLIC PANEL: CPT | Performed by: EMERGENCY MEDICINE

## 2024-06-07 RX ORDER — POTASSIUM CHLORIDE 20 MEQ/1
40 TABLET, EXTENDED RELEASE ORAL ONCE
Status: COMPLETED | OUTPATIENT
Start: 2024-06-07 | End: 2024-06-07

## 2024-06-07 RX ORDER — ONDANSETRON 2 MG/ML
4 INJECTION INTRAMUSCULAR; INTRAVENOUS ONCE
Status: COMPLETED | OUTPATIENT
Start: 2024-06-07 | End: 2024-06-07

## 2024-06-07 RX ADMIN — IOHEXOL 100 ML: 350 INJECTION, SOLUTION INTRAVENOUS at 19:41

## 2024-06-07 RX ADMIN — MORPHINE SULFATE 2 MG: 2 INJECTION, SOLUTION INTRAMUSCULAR; INTRAVENOUS at 18:52

## 2024-06-07 RX ADMIN — SODIUM CHLORIDE 1000 ML: 0.9 INJECTION, SOLUTION INTRAVENOUS at 18:52

## 2024-06-07 RX ADMIN — ONDANSETRON 4 MG: 2 INJECTION INTRAMUSCULAR; INTRAVENOUS at 18:52

## 2024-06-07 RX ADMIN — POTASSIUM CHLORIDE 40 MEQ: 1500 TABLET, EXTENDED RELEASE ORAL at 20:10

## 2024-06-07 NOTE — ED PROVIDER NOTES
History  Chief Complaint   Patient presents with    Abdominal Pain     Intermittent lower abd pain x30 minutes.  No n/v/d no vag bleeding/discharge no dysuria     She is complaining of 30 minutes of mid lower abdomen pain comes in waves any fever chest pain shortness of breath vomiting or diarrhea no vaginal bleeding  no dysuria she has not taken anything for the pain      Abdominal Pain  Associated symptoms: no chest pain, no chills, no cough, no diarrhea, no dysuria, no fever, no hematuria, no shortness of breath, no vaginal bleeding, no vaginal discharge and no vomiting        Prior to Admission Medications   Prescriptions Last Dose Informant Patient Reported? Taking?   FLUoxetine (PROzac) 20 mg capsule   No No   Sig: Take 1 capsule (20 mg total) by mouth daily Do not start before 2022.   LORazepam (Ativan) 0.5 mg tablet   Yes No   Sig: Take by mouth every 8 (eight) hours as needed for anxiety   acetaminophen (TYLENOL) 500 mg tablet   No No   Sig: Take 1 tablet (500 mg total) by mouth every 6 (six) hours as needed for mild pain   acetaminophen (TYLENOL) 650 mg CR tablet   No No   Sig: Take 1 tablet (650 mg total) by mouth every 8 (eight) hours as needed for mild pain   amphetamine-dextroamphetamine (ADDERALL, 5MG,) 5 MG tablet   Yes No   Sig: daily   benzonatate (TESSALON PERLES) 100 mg capsule   No No   Sig: Take 1 capsule (100 mg total) by mouth every 8 (eight) hours   cholecalciferol (VITAMIN D3) 1,000 units tablet   No No   Sig: Take 2 tablets (2,000 Units total) by mouth daily Do not start before 2022.   levonorgestrel (MIRENA) 20 MCG/24HR IUD   Yes No   Si each by Intrauterine route once   lidocaine (LIDODERM) 5 %   No No   Sig: Apply 1 patch topically over 12 hours every 24 hours for 15 days Remove & Discard patch within 12 hours or as directed by MD   neomycin-polymyxin-hydrocortisone (CORTISPORIN) 0.35%-10,000 units/mL-1% otic suspension   No No   Sig: Administer 4 drops  into the left ear 3 (three) times a day for 10 days   risperiDONE (RisperDAL) 2 mg tablet   Yes No      Facility-Administered Medications: None       Past Medical History:   Diagnosis Date    ADHD     Anemia     with pregnancy     Anxiety     Depression     Gestational diabetes     2017    History of migraine headaches     History of postpartum hemorrhage     PTSD (post-traumatic stress disorder)        Past Surgical History:   Procedure Laterality Date    NO PAST SURGERIES         Family History   Problem Relation Age of Onset    Anxiety disorder Mother     No Known Problems Father     Anxiety disorder Sister     Anxiety disorder Brother     Other Daughter         non-ketotic hyperglycemia      I have reviewed and agree with the history as documented.    E-Cigarette/Vaping    E-Cigarette Use Current Every Day User     Start Date 1/1/19      E-Cigarette/Vaping Substances    Nicotine Yes     THC No     CBD No     Flavoring Yes     Other No     Unknown No      Social History     Tobacco Use    Smoking status: Every Day     Types: Cigarettes    Smokeless tobacco: Never   Vaping Use    Vaping status: Every Day    Start date: 1/1/2019    Substances: Nicotine, Flavoring   Substance Use Topics    Alcohol use: Not Currently     Comment: occasional    Drug use: Not Currently     Types: Cocaine       Review of Systems   Constitutional:  Negative for chills and fever.   Respiratory:  Negative for cough and shortness of breath.    Cardiovascular:  Negative for chest pain.   Gastrointestinal:  Positive for abdominal pain. Negative for diarrhea and vomiting.   Genitourinary:  Negative for dysuria, hematuria, vaginal bleeding and vaginal discharge.   Musculoskeletal:  Negative for arthralgias and back pain.   Skin:  Negative for color change and rash.   Neurological:  Negative for seizures and syncope.   All other systems reviewed and are negative.      Physical Exam  Physical Exam  Vitals and nursing note reviewed.    Constitutional:       General: She is not in acute distress.     Appearance: She is well-developed.   HENT:      Head: Normocephalic and atraumatic.   Eyes:      Conjunctiva/sclera: Conjunctivae normal.   Cardiovascular:      Rate and Rhythm: Normal rate and regular rhythm.      Heart sounds: No murmur heard.  Pulmonary:      Effort: Pulmonary effort is normal. No respiratory distress.      Breath sounds: Normal breath sounds.   Abdominal:      Palpations: Abdomen is soft.      Tenderness: There is no right CVA tenderness, left CVA tenderness, guarding or rebound. Negative signs include Benedict's sign, Rovsing's sign and McBurney's sign.      Comments: Mild tenderness periumbilical and entire lower abdomen to palpation no rebound no guarding   Musculoskeletal:         General: No swelling.      Cervical back: Neck supple.   Skin:     General: Skin is warm and dry.      Capillary Refill: Capillary refill takes less than 2 seconds.   Neurological:      Mental Status: She is alert.   Psychiatric:         Mood and Affect: Mood normal.         Vital Signs  ED Triage Vitals   Temperature Pulse Respirations Blood Pressure SpO2   06/07/24 1824 06/07/24 1824 06/07/24 1824 06/07/24 1824 06/07/24 1824   98.1 °F (36.7 °C) 96 18 122/78 96 %      Temp Source Heart Rate Source Patient Position - Orthostatic VS BP Location FiO2 (%)   06/07/24 1824 06/07/24 1824 06/07/24 1824 06/07/24 1824 --   Oral Monitor Sitting Left arm       Pain Score       06/07/24 1852       10 - Worst Possible Pain           Vitals:    06/07/24 1824   BP: 122/78   Pulse: 96   Patient Position - Orthostatic VS: Sitting         Visual Acuity      ED Medications  Medications   sodium chloride 0.9 % bolus 1,000 mL (1,000 mL Intravenous New Bag 6/7/24 1852)   morphine injection 2 mg (2 mg Intravenous Given 6/7/24 1852)   ondansetron (ZOFRAN) injection 4 mg (4 mg Intravenous Given 6/7/24 1852)   iohexol (OMNIPAQUE) 350 MG/ML injection (MULTI-DOSE) 100 mL (100 mL  Intravenous Given 6/7/24 1941)   potassium chloride (Klor-Con M20) CR tablet 40 mEq (40 mEq Oral Given 6/7/24 2010)       Diagnostic Studies  Results Reviewed       Procedure Component Value Units Date/Time    Comprehensive metabolic panel [[id number]]  (Abnormal) Collected: 06/07/24 1854    Lab Status: Final result Specimen: Blood from Arm, Right Updated: 06/07/24 1924     Sodium 139 mmol/L      Potassium 3.4 mmol/L      Chloride 104 mmol/L      CO2 26 mmol/L      ANION GAP 9 mmol/L      BUN 11 mg/dL      Creatinine 0.84 mg/dL      Glucose 94 mg/dL      Calcium 9.8 mg/dL      AST 15 U/L      ALT 20 U/L      Alkaline Phosphatase 50 U/L      Total Protein 7.5 g/dL      Albumin 4.5 g/dL      Total Bilirubin 0.49 mg/dL      eGFR 92 ml/min/1.73sq m     Narrative:      National Kid[employer] Foundation guidelines for Chronic Kidney Disease (CKD):     Stage 1 with normal or high GFR (GFR > 90 mL/min/1.73 square meters)    Stage 2 Mild CKD (GFR = 60-89 mL/min/1.73 square meters)    Stage 3A Moderate CKD (GFR = 45-59 mL/min/1.73 square meters)    Stage 3B Moderate CKD (GFR = 30-44 mL/min/1.73 square meters)    Stage 4 Severe CKD (GFR = 15-29 mL/min/1.73 square meters)    Stage 5 End Stage CKD (GFR <15 mL/min/1.73 square meters)  Note: GFR calculation is accurate only with a steady state creatinine    Lipase [[id number]]  (Normal) Collected: 06/07/24 1854    Lab Status: Final result Specimen: Blood from Arm, Right Updated: 06/07/24 1924     Lipase 29 u/L     Urine Microscopic [[id number]]  (Abnormal) Collected: 06/07/24 1848    Lab Status: Final result Specimen: Urine, Clean Catch Updated: 06/07/24 1913     RBC, UA Innumerable /hpf      WBC, UA 1-2 /hpf      Epithelial Cells Occasional /hpf      Bacteria, UA None Seen /hpf      Ca Oxalate Batsheva, UA Occasional /hpf     UA w Reflex to Microscopic w Reflex to Culture [[id number]]  (Abnormal) Collected: 06/07/24 1848    Lab Status: Final result Specimen: Urine, Clean Catch Updated:  06/07/24 1912     Color, UA Orange     Clarity, UA Bloody     Specific Gravity, UA 1.025     pH, UA 6.5     Leukocytes, UA 25.0     Nitrite, UA Negative     Protein, UA >=500 mg/dl      Glucose, UA Negative mg/dl      Ketones, UA 5 (Trace) mg/dl      Bilirubin, UA 1 mg/dL     Occult Blood, .0     UROBILINOGEN UA Negative mg/dL     CBC and differential [[id number]]  (Abnormal) Collected: 06/07/24 1854    Lab Status: Final result Specimen: Blood from Arm, Right Updated: 06/07/24 1901     WBC 10.22 Thousand/uL      RBC 4.52 Million/uL      Hemoglobin 13.2 g/dL      Hematocrit 38.2 %      MCV 85 fL      MCH 29.2 pg      MCHC 34.6 g/dL      RDW 12.4 %      MPV 10.2 fL      Platelets 300 Thousands/uL      nRBC 0 /100 WBCs      Segmented % 67 %      Immature Grans % 0 %      Lymphocytes % 25 %      Monocytes % 6 %      Eosinophils Relative 2 %      Basophils Relative 0 %      Absolute Neutrophils 6.90 Thousands/µL      Absolute Immature Grans 0.01 Thousand/uL      Absolute Lymphocytes 2.57 Thousands/µL      Absolute Monocytes 0.56 Thousand/µL      Eosinophils Absolute 0.15 Thousand/µL      Basophils Absolute 0.03 Thousands/µL     POCT pregnancy, urine [[id number]]  (Normal) Resulted: 06/07/24 1851    Lab Status: Final result Updated: 06/07/24 1851     EXT Preg Test, Ur Negative     Control Valid                   CT abdomen pelvis with contrast   Final Result by Santhosh Amaral MD (06/07 2025)      No acute intra-abdominal abnormality.      Trace pelvic free fluid likely physiologic.   5.4 x 3.5 cm thinly septated left ovarian cyst versus 2 adjacent simple cysts. A nonemergent pelvic ultrasound is recommended for further characterization.      IUD which appears to be in appropriate position.      4.2 cm probable right hepatic lobe hemangioma. No prior studies are available for comparison. Further evaluation with hepatic ultrasound and/or enhanced CT MRI of the liver is recommended for further characterization.                      Workstation performed: [id number]                    Procedures  Procedures         ED Course                               SBIRT 22yo+      Flowsheet Row Most Recent Value   Initial Alcohol Screen: US AUDIT-C     1. How often do you have a drink containing alcohol? 0 Filed at: 06/07/2024 1944   2. How many drinks containing alcohol do you have on a typical day you are drinking?  0 Filed at: 06/07/2024 1944   3b. FEMALE Any Age, or MALE 65+: How often do you have 4 or more drinks on one occassion? 0 Filed at: 06/07/2024 1944   Audit-C Score 0 Filed at: 06/07/2024 1944   ROSANNA: How many times in the past year have you...    Used an illegal drug or used a prescription medication for non-medical reasons? Never Filed at: 06/07/2024 1944                      Medical Decision Making  His workup was essentially normal normal CBC no signs of infection electrolytes LFTs unremarkable lipase normal some hematuria no signs of UTI patient's IUD is in position does have a left ovarian cyst her abdomen is benign on repeat exam there is no left lower quadrant tenderness patient does not clinically have a torsion patient also has what appears to be an hemangioma on the liver will need follow-up with medically not appendicitis ovarian torsion    Amount and/or Complexity of Data Reviewed  Labs: ordered.  Radiology: ordered.    Risk  Prescription drug management.             Disposition  Final diagnoses:   Abdominal pain, unspecified abdominal location   Ovarian cyst     Time reflects when diagnosis was documented in both MDM as applicable and the Disposition within this note       Time User Action Codes Description Comment    6/7/2024  9:22 PM Jerry Case Add [R10.9] Abdominal pain, unspecified abdominal location     6/7/2024  9:22 PM Jerry Case Add [N83.209] Ovarian cyst           ED Disposition       ED Disposition   Discharge    Condition   Stable    Date/Time   Fri Jun 7, 2024 2121    Comment   Gissel Leigh  discharge to home/self care.                   Follow-up Information       Follow up With Specialties Details Why Contact Info Additional Information    [facility] In 3 days  [address]  [place] [facility] [place] [address]  [phone]       [facility] In 3 days  [address] [place] [address], [address]  [facility] [address] [phone] [facility], [address] [place] [address], [facility] [address], [place], [place], [address] [phone]            Patient's Medications   Discharge Prescriptions    No medications on file       No discharge procedures on file.    PDMP Review         Value Time User    PDMP Reviewed  Yes 11/23/2022  8:35 AM Daphney Gunn MD            ED Provider  Electronically Signed by             Jerry Case MD  06/07/24 1085

## 2024-06-07 NOTE — Clinical Note
Gissel Leigh was seen and treated in our emergency department on 6/7/2024.                Diagnosis:     Gissel  .    She may return on this date: 06/09/2024         If you have any questions or concerns, please don't hesitate to call.      Jerry Case MD    ______________________________           _______________          _______________  Hospital Representative                              Date                                Time

## 2025-08-07 ENCOUNTER — TELEPHONE (OUTPATIENT)
Age: 35
End: 2025-08-07

## 2025-08-07 ENCOUNTER — OFFICE VISIT (OUTPATIENT)
Dept: FAMILY MEDICINE CLINIC | Facility: CLINIC | Age: 35
End: 2025-08-07
Payer: COMMERCIAL

## 2025-08-07 VITALS
HEART RATE: 94 BPM | WEIGHT: 176.6 LBS | HEIGHT: 61 IN | TEMPERATURE: 97.9 F | DIASTOLIC BLOOD PRESSURE: 100 MMHG | SYSTOLIC BLOOD PRESSURE: 152 MMHG | BODY MASS INDEX: 33.34 KG/M2 | OXYGEN SATURATION: 98 %

## 2025-08-07 DIAGNOSIS — F33.3 MAJOR DEPRESSIVE DISORDER, RECURRENT, SEVERE WITH PSYCHOTIC FEATURES (HCC): Chronic | ICD-10-CM

## 2025-08-07 DIAGNOSIS — F40.01 PANIC DISORDER WITH AGORAPHOBIA: Chronic | ICD-10-CM

## 2025-08-07 DIAGNOSIS — F41.1 GAD (GENERALIZED ANXIETY DISORDER): Chronic | ICD-10-CM

## 2025-08-07 DIAGNOSIS — F43.12 POST-TRAUMATIC STRESS DISORDER, CHRONIC: Chronic | ICD-10-CM

## 2025-08-07 DIAGNOSIS — Z00.00 ANNUAL PHYSICAL EXAM: Primary | ICD-10-CM

## 2025-08-07 PROCEDURE — 99214 OFFICE O/P EST MOD 30 MIN: CPT

## 2025-08-07 PROCEDURE — 99395 PREV VISIT EST AGE 18-39: CPT

## 2025-08-07 RX ORDER — VENLAFAXINE HYDROCHLORIDE 150 MG/1
150 CAPSULE, EXTENDED RELEASE ORAL
Qty: 90 CAPSULE | Refills: 1 | Status: SHIPPED | OUTPATIENT
Start: 2025-08-07 | End: 2025-08-07

## 2025-08-07 RX ORDER — ARIPIPRAZOLE 10 MG/1
10 TABLET ORAL DAILY
Qty: 90 TABLET | Refills: 1 | Status: SHIPPED | OUTPATIENT
Start: 2025-08-07

## 2025-08-07 RX ORDER — VENLAFAXINE HYDROCHLORIDE 75 MG/1
150 CAPSULE, EXTENDED RELEASE ORAL
Qty: 180 CAPSULE | Refills: 0 | Status: SHIPPED | OUTPATIENT
Start: 2025-08-07